# Patient Record
Sex: MALE | Race: WHITE | URBAN - METROPOLITAN AREA
[De-identification: names, ages, dates, MRNs, and addresses within clinical notes are randomized per-mention and may not be internally consistent; named-entity substitution may affect disease eponyms.]

---

## 2017-01-18 ENCOUNTER — OFFICE VISIT (OUTPATIENT)
Dept: PAIN MANAGEMENT | Age: 47
End: 2017-01-18

## 2017-01-18 VITALS
WEIGHT: 198 LBS | SYSTOLIC BLOOD PRESSURE: 172 MMHG | BODY MASS INDEX: 29.33 KG/M2 | HEIGHT: 69 IN | DIASTOLIC BLOOD PRESSURE: 91 MMHG | HEART RATE: 113 BPM

## 2017-01-18 DIAGNOSIS — G89.4 CHRONIC PAIN SYNDROME: Primary | ICD-10-CM

## 2017-01-18 DIAGNOSIS — G89.29 INSOMNIA SECONDARY TO CHRONIC PAIN: ICD-10-CM

## 2017-01-18 DIAGNOSIS — R00.0 TACHYCARDIA: ICD-10-CM

## 2017-01-18 DIAGNOSIS — M15.9 PRIMARY OSTEOARTHRITIS INVOLVING MULTIPLE JOINTS: ICD-10-CM

## 2017-01-18 DIAGNOSIS — M62.838 MUSCLE SPASM: ICD-10-CM

## 2017-01-18 DIAGNOSIS — F51.04 CHRONIC INSOMNIA: ICD-10-CM

## 2017-01-18 DIAGNOSIS — M51.37 DEGENERATION OF LUMBAR OR LUMBOSACRAL INTERVERTEBRAL DISC: ICD-10-CM

## 2017-01-18 DIAGNOSIS — I10 ESSENTIAL HYPERTENSION: ICD-10-CM

## 2017-01-18 DIAGNOSIS — G47.01 INSOMNIA SECONDARY TO CHRONIC PAIN: ICD-10-CM

## 2017-01-18 RX ORDER — OXYCODONE AND ACETAMINOPHEN 10; 325 MG/1; MG/1
TABLET ORAL
Qty: 120 TAB | Refills: 0 | Status: SHIPPED | OUTPATIENT
Start: 2017-01-18 | End: 2017-03-15 | Stop reason: SDUPTHER

## 2017-01-18 RX ORDER — MORPHINE SULFATE 100 MG/1
100 TABLET, FILM COATED, EXTENDED RELEASE ORAL EVERY 8 HOURS
Qty: 90 TAB | Refills: 0 | Status: SHIPPED | OUTPATIENT
Start: 2017-01-18 | End: 2017-02-17

## 2017-01-18 RX ORDER — MORPHINE SULFATE 100 MG/1
100 TABLET, FILM COATED, EXTENDED RELEASE ORAL EVERY 8 HOURS
Qty: 90 TAB | Refills: 0 | Status: SHIPPED | OUTPATIENT
Start: 2017-01-18 | End: 2017-03-15 | Stop reason: SDUPTHER

## 2017-01-18 RX ORDER — ALPRAZOLAM 1 MG/1
TABLET ORAL
Qty: 45 TAB | Refills: 1 | Status: SHIPPED | OUTPATIENT
Start: 2017-01-18 | End: 2017-03-15 | Stop reason: SDUPTHER

## 2017-01-18 NOTE — PATIENT INSTRUCTIONS
1.  You must have a follow up with your pcp to evaluate your blood pressure (in order for this office to continue to manage your pain)  2. Continue medications as prescribed  3. EKG today  4. Follow up in two months  5.   Please go to pcpED for your cough

## 2017-01-18 NOTE — PROGRESS NOTES
Nursing Notes    Patient presents to the office today in follow-up. Patient rates his pain at 7/10 on the numerical pain scale. Reviewed medications with counts as follows:    Rx Date filled Qty Dispensed Pill Count Last Dose Short   Percocet 10/325 mg  12/22/16 120 20 today no   Xanax 1 mg  12/21/16 45 8 Last night no   Morphine sulfate  mg 12/22/16 90 13 today no                     POC UDS was not performed in office today    Any new labs or imaging since last appointment? NO    Have you been to an emergency room (ER) or urgent care clinic since your last visit? NO            Have you been hospitalized since your last visit? NO     If yes, where, when, and reason for visit? Have you seen or consulted any other health care providers outside of the [address] [place] [address]  since your last visit? NO     If yes, where, when, and reason for visit? HM deferred to pcp.

## 2017-01-18 NOTE — PROGRESS NOTES
HISTORY OF PRESENT ILLNESS  Oscar Burciaga is a 40 y.o. male. HPI  The patient presents today for follow up of chronic severe pain involving the lumbar spine attributed to degenerative disc disease, spondylosis, and radiculopathy. The patient denies any significant changes since last f/u. He tolerates medications without side effects. Oscar Burciaga reports no change in sleep or constipation. He takes xanax for sleep. No cpap. The patient reports 60% relief with current medications. His pain is constant and achy. 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 He does do a lot of traveling for work. Any activity increases his pain while rest and medication help alleviate. He has tried cutting back on his pain medication but reports his pain level increases when he does this. He has been stable on his current dose for several years. He works full time. He reports he could not work without pain management. The patient reports functional improvement and QOL with pain medication. He is advised in order for this office to continue to treat his chronic pain he must have regular follow ups with his pcp. UDS 11/23/16 reviewed and consistent  Will do EKG in office today. He is not getting many hours for work and has not been able to do recommended follow ups. He reports not been feeling well for about 2 weeks (coughing, no fever). He is trying to quit smoking. Review of Systems   Constitutional: Positive for malaise/fatigue. Negative for chills, diaphoresis and fever. Eyes: Negative for blurred vision and double vision. Respiratory: Negative for cough, shortness of breath and wheezing. Cardiovascular: Positive for leg swelling (knees). Negative for chest pain and palpitations. Gastrointestinal: Negative for constipation, diarrhea and nausea. Genitourinary: Negative. Musculoskeletal: Positive for joint pain (knees) and myalgias.  Negative for falls and neck pain. Skin: Negative for rash. Neurological: Positive for headaches. Negative for dizziness and tremors. Psychiatric/Behavioral: Negative for depression. The patient has insomnia. The patient is not nervous/anxious. All other systems reviewed and are negative. Physical Exam   Constitutional: He is oriented to person, place, and time. He appears well-developed and well-nourished. No distress. HENT:   Head: Normocephalic. Right Ear: External ear normal.   Left Ear: External ear normal.   Eyes: Conjunctivae and EOM are normal. Pupils are equal, round, and reactive to light. Neck: Normal range of motion. Pulmonary/Chest: Effort normal. No respiratory distress. Musculoskeletal: He exhibits no edema or tenderness. Right knee: He exhibits decreased range of motion. No tenderness (but pain and crepitus) found. Left knee: He exhibits decreased range of motion. No tenderness (but pain and crepitus) found. Lumbar back: He exhibits decreased range of motion and pain. He exhibits no tenderness. Neurological: He is alert and oriented to person, place, and time. No cranial nerve deficit (grossly intact). Gait (antalgic) abnormal.   Skin: Skin is warm and dry. Psychiatric: He has a normal mood and affect. His behavior is normal. Judgment and thought content normal.   Nursing note and vitals reviewed. ASSESSMENT and PLAN  Encounter Diagnoses   Name Primary?     Chronic pain syndrome Yes    Degeneration of lumbar or lumbosacral intervertebral disc     Primary osteoarthritis involving multiple joints     Chronic insomnia     Essential hypertension     Tachycardia     Insomnia secondary to chronic pain     Muscle spasm      Orders Placed This Encounter    AMB POC EKG ROUTINE W/ 12 LEADS, INTER & REP    morphine CR (MS CONTIN) 100 mg CR tablet    morphine CR (MS CONTIN) 100 mg CR tablet    oxyCODONE-acetaminophen (PERCOCET)  mg per tablet    oxyCODONE-acetaminophen (PERCOCET)  mg per tablet    ALPRAZolam (XANAX) 1 mg tablet      Patient Instructions   1. You must have a follow up with your pcp to evaluate your blood pressure (in order for this office to continue to manage your pain)  2. Continue medications as prescribed  3. EKG today  4. Follow up in two months  5. Please go to pcpED for your cough      40 minutes spent in visit face to face with the patient. >50% of time spent counseling and coordinating care  EKG done in office/pt advised to see pcp asap  Dr. Nicole Jacobo to review results.

## 2017-01-18 NOTE — MR AVS SNAPSHOT
Visit Information Date & Time Provider Department Dept. Phone Encounter #  
 1/18/2017  4:00 PM Zenaida Butcher, [facility] [id number] Follow-up Instructions Return in about 2 months (around 3/18/2017). Upcoming Health Maintenance Date Due Pneumococcal 19-64 Medium Risk (1 of 1 - PPSV23) 8/14/1990 DTaP/Tdap/Td series (1 - Tdap) 8/14/1992 INFLUENZA AGE 9 TO ADULT 8/1/2016 Allergies as of 1/18/2017  Review Complete On: 1/18/2017 By: VALERIE Henderson Severity Noted Reaction Type Reactions Naprosyn [Naproxen]    Other (comments) Upset stomach Current Immunizations  Never Reviewed No immunizations on file. Not reviewed this visit You Were Diagnosed With   
  
 Codes Comments Chronic pain syndrome    -  Primary ICD-10-CM: G89.4 ICD-9-CM: 338.4 Degeneration of lumbar or lumbosacral intervertebral disc     ICD-10-CM: M51.37 
ICD-9-CM: 722.52 Primary osteoarthritis involving multiple joints     ICD-10-CM: M15.0 ICD-9-CM: 715.09 Chronic insomnia     ICD-10-CM: F51.04 
ICD-9-CM: 780.52 Essential hypertension     ICD-10-CM: I10 
ICD-9-CM: 401.9 Tachycardia     ICD-10-CM: R00.0 ICD-9-CM: 785.0 Insomnia secondary to chronic pain     ICD-10-CM: G89.29, G47.01 
ICD-9-CM: 338.29, 327.01   
 Muscle spasm     ICD-10-CM: X19.931 ICD-9-CM: 728.85 Vitals BP Pulse Height(growth percentile) Weight(growth percentile) BMI Smoking Status (!) 172/91 (!) 113 5' 9\" (1.753 m) 198 lb (89.8 kg) 29.24 kg/m2 Current Every Day Smoker BMI and BSA Data Body Mass Index Body Surface Area  
 29.24 kg/m 2 2.09 m 2 Preferred Pharmacy Pharmacy Name Phone [place], [address] [phone] Your Updated Medication List  
  
   
 This list is accurate as of: 1/18/17  4:27 PM.  Always use your most recent med list.  
  
  
  
  
 * ALPRAZolam 1 mg tablet Commonly known as:  Nolan Amend Take 1 tab by mouth at bedtime for insomnia. May take a second tab for severe insomnia. Due to fill 6/24/16 * ALPRAZolam 1 mg tablet Commonly known as:  Nolan Amend Take 1 tab by mouth at bedtime for insomnia. May take a second tab for severe insomnia. cyclobenzaprine 10 mg tablet Commonly known as:  FLEXERIL Take 1 Tab by mouth three (3) times daily as needed for Muscle Spasm(s). ibuprofen 200 mg tablet Commonly known as:  MOTRIN Take  by mouth. * morphine  mg CR tablet Commonly known as:  MS CONTIN Take  by mouth three (3) times daily. * morphine  mg CR tablet Commonly known as:  MS CONTIN Take 1 Tab by mouth every eight (8) hours for 30 days. For chronic pain. (due to fill 2/18/17)  Indications: CHRONIC PAIN WITH OPIOID TOLERANCE, SEVERE PAIN WITH OPIOID TOLERANCE  
  
 * morphine  mg CR tablet Commonly known as:  MS CONTIN Take 1 Tab by mouth every eight (8) hours for 30 days. For chronic pain. Due to fill 1/20/17  Indications: CHRONIC PAIN WITH OPIOID TOLERANCE, SEVERE PAIN WITH OPIOID TOLERANCE  
  
 * oxyCODONE-acetaminophen  mg per tablet Commonly known as:  PERCOCET Take one tab po up to four times per day prn severe pain (due to fill 7/1/16) * oxyCODONE-acetaminophen  mg per tablet Commonly known as:  PERCOCET Take one tab po up to four times per day prn severe pain  Indications: PAIN  
  
 * oxyCODONE-acetaminophen  mg per tablet Commonly known as:  PERCOCET Take one tab po up to four times per day prn severe pain (due to fill 2/18/17) * oxyCODONE-acetaminophen  mg per tablet Commonly known as:  PERCOCET Take one tab po up to four times per day prn severe pain (due to fill 1/20/17)  Indications: PAIN  
  
 tiZANidine 4 mg tablet Commonly known as:  Penny Fulling Take 1/2 to one tab po up to three times per day prn muscle spasms * Notice: This list has 9 medication(s) that are the same as other medications prescribed for you. Read the directions carefully, and ask your doctor or other care provider to review them with you. Prescriptions Printed Refills  
 morphine CR (MS CONTIN) 100 mg CR tablet 0 Sig: Take 1 Tab by mouth every eight (8) hours for 30 days. For chronic pain. (due to fill 2/18/17)  Indications: CHRONIC PAIN WITH OPIOID TOLERANCE, SEVERE PAIN WITH OPIOID TOLERANCE Class: Print Route: Oral  
 morphine CR (MS CONTIN) 100 mg CR tablet 0 Sig: Take 1 Tab by mouth every eight (8) hours for 30 days. For chronic pain. Due to fill 1/20/17  Indications: CHRONIC PAIN WITH OPIOID TOLERANCE, SEVERE PAIN WITH OPIOID TOLERANCE Class: Print Route: Oral  
 oxyCODONE-acetaminophen (PERCOCET)  mg per tablet 0 Sig: Take one tab po up to four times per day prn severe pain (due to fill 2/18/17) Class: Print  
 oxyCODONE-acetaminophen (PERCOCET)  mg per tablet 0 Sig: Take one tab po up to four times per day prn severe pain (due to fill 1/20/17)  Indications: PAIN Class: Print ALPRAZolam (XANAX) 1 mg tablet 1 Sig: Take 1 tab by mouth at bedtime for insomnia. May take a second tab for severe insomnia. Class: Print We Performed the Following AMB POC EKG ROUTINE W/ 12 LEADS, INTER & REP [90425 CPT(R)] Follow-up Instructions Return in about 2 months (around 3/18/2017). Patient Instructions 1. You must have a follow up with your pcp to evaluate your blood pressure (in order for this office to continue to manage your pain) 2. Continue medications as prescribed 3. EKG today 4. Follow up in two months 5. Please go to pcpED for your cough Introducing [facility]!    
 Kerrie Valle introduces [employer] patient portal. Now you can access parts of your medical record, email your doctor's office, and request medication refills online. 1. In your internet browser, go to [email or web address] 2. Click on the First Time User? Click Here link in the Sign In box. You will see the New Member Sign Up page. 3. Enter your [employer] Access Code exactly as it appears below. You will not need to use this code after youve completed the sign-up process. If you do not sign up before the expiration date, you must request a new code. · [employer] Access Code: [email or web address] Expires: 4/18/2017  4:07 PM 
 
4. Enter the last four digits of your Social Security Number (xxxx) and Date of Birth (mm/dd/yyyy) as indicated and click Submit. You will be taken to the next sign-up page. 5. Create a [employer] ID. This will be your [employer] login ID and cannot be changed, so think of one that is secure and easy to remember. 6. Create a [employer] password. You can change your password at any time. 7. Enter your Password Reset Question and Answer. This can be used at a later time if you forget your password. 8. Enter your e-mail address. You will receive e-mail notification when new information is available in [address] 9. Click Sign Up. You can now view and download portions of your medical record. 10. Click the Download Summary menu link to download a portable copy of your medical information. If you have questions, please visit the Frequently Asked Questions section of the [employer] website. Remember, [employer] is NOT to be used for urgent needs. For medical emergencies, dial 911. Now available from your iPhone and Android! Please provide this summary of care documentation to your next provider. Your primary care clinician is listed as Alva Biggs. If you have any questions after today's visit, please call [phone].

## 2017-03-15 ENCOUNTER — OFFICE VISIT (OUTPATIENT)
Dept: PAIN MANAGEMENT | Age: 47
End: 2017-03-15

## 2017-03-15 VITALS
HEART RATE: 96 BPM | WEIGHT: 198 LBS | DIASTOLIC BLOOD PRESSURE: 97 MMHG | BODY MASS INDEX: 29.24 KG/M2 | SYSTOLIC BLOOD PRESSURE: 180 MMHG

## 2017-03-15 DIAGNOSIS — G89.4 CHRONIC PAIN SYNDROME: Primary | ICD-10-CM

## 2017-03-15 DIAGNOSIS — M62.838 MUSCLE SPASM: ICD-10-CM

## 2017-03-15 DIAGNOSIS — M51.37 DEGENERATION OF LUMBAR OR LUMBOSACRAL INTERVERTEBRAL DISC: ICD-10-CM

## 2017-03-15 DIAGNOSIS — M54.17 LUMBOSACRAL RADICULOPATHY: ICD-10-CM

## 2017-03-15 DIAGNOSIS — M15.9 PRIMARY OSTEOARTHRITIS INVOLVING MULTIPLE JOINTS: ICD-10-CM

## 2017-03-15 DIAGNOSIS — F51.04 CHRONIC INSOMNIA: ICD-10-CM

## 2017-03-15 DIAGNOSIS — Z79.899 ENCOUNTER FOR LONG-TERM (CURRENT) USE OF HIGH-RISK MEDICATION: ICD-10-CM

## 2017-03-15 RX ORDER — MORPHINE SULFATE 100 MG/1
100 TABLET, FILM COATED, EXTENDED RELEASE ORAL EVERY 8 HOURS
Qty: 90 TAB | Refills: 0 | Status: SHIPPED | OUTPATIENT
Start: 2017-03-15 | End: 2017-04-14

## 2017-03-15 RX ORDER — ALPRAZOLAM 1 MG/1
TABLET ORAL
Qty: 45 TAB | Refills: 1 | Status: SHIPPED | OUTPATIENT
Start: 2017-03-15 | End: 2017-05-12 | Stop reason: SDUPTHER

## 2017-03-15 RX ORDER — OXYCODONE AND ACETAMINOPHEN 10; 325 MG/1; MG/1
TABLET ORAL
Qty: 120 TAB | Refills: 0 | Status: SHIPPED | OUTPATIENT
Start: 2017-03-15 | End: 2017-08-28

## 2017-03-15 RX ORDER — NALOXONE HYDROCHLORIDE 4 MG/.1ML
4 SPRAY NASAL AS NEEDED
Qty: 1 BOX | Refills: 1 | Status: SHIPPED | OUTPATIENT
Start: 2017-03-15

## 2017-03-15 RX ORDER — AMLODIPINE BESYLATE 5 MG/1
5 TABLET ORAL DAILY
COMMUNITY

## 2017-03-15 RX ORDER — OXYCODONE AND ACETAMINOPHEN 10; 325 MG/1; MG/1
TABLET ORAL
Qty: 120 TAB | Refills: 0 | Status: SHIPPED | OUTPATIENT
Start: 2017-03-15 | End: 2017-06-06 | Stop reason: SDUPTHER

## 2017-03-15 NOTE — PROGRESS NOTES
Nursing Notes    Patient presents to the office today in follow-up. Patient rates his pain at 7/10 on the numerical pain scale. Reviewed medications with counts as follows:    Rx Date filled Qty Dispensed Pill Count Last Dose Short   Percocet 10 2/17/17 120 20 3/15/17 no   MS Contin 100 2/17/17 90 16 3/15/17 no   Xanax 1 2/17/17 45 8 3/15/17 no         Comments:     POC UDS was not performed in office today    Any new labs or imaging since last appointment? NO    Have you been to an emergency room (ER) or urgent care clinic since your last visit? NO            Have you been hospitalized since your last visit? NO     If yes, where, when, and reason for visit? Have you seen or consulted any other health care providers outside of the [facility]  since your last visit? NO     If yes, where, when, and reason for visit? Mr. Erlin Keita has a reminder for a \"due or due soon\" health maintenance. I have asked that he contact his primary care provider for follow-up on this health maintenance.

## 2017-03-15 NOTE — PATIENT INSTRUCTIONS
1.  Continue medications as prescribed  2. Follow up in two months  3. Please see pcp regarding elevated bp  4. Naloxone prescription written and discussed (for acute opioid toxicity only)  40 minutes spent in visit face to face with the patient.     >50% of time spent counseling and coordinating care

## 2017-03-15 NOTE — PROGRESS NOTES
HISTORY OF PRESENT ILLNESS  Sarika Chu is a 40 y.o. male. HPI  The patient presents today for follow up of chronic severe pain involving the lumbar spine attributed to degenerative disc disease, spondylosis, and radiculopathy. He has an upcoming appointment with a family physician regarding his blood pressure. Discussed at length again today. The patient denies any significant changes since last f/u. He tolerates medications without side effects. Sarika Chu reports no change in sleep or constipation. He takes xanax for sleep. No cpap. The patient reports 30-60% relief with current medications. His pain is constant and achy. 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 He does do a lot of traveling for work. Any activity increases his pain while rest and medication help alleviate. He has tried cutting back on his pain medication but reports his pain level increases when he does this. He has been stable on his current dose for several years. He works full time. He reports he could not work without pain management. The patient reports functional improvement and QOL with pain medication. Because the patient's current regimen places him/her at increased risk for possible overdose, a prescription for naloxone nasal spray is being provided. The patient understands that this medication is only to be used in the setting of a possible overdose and that inadvertent use of this medication could precipitate overt withdrawal.      Review of Systems   Constitutional: Positive for malaise/fatigue. Negative for chills, diaphoresis and fever. Eyes: Negative for blurred vision and double vision. Respiratory: Negative for cough, shortness of breath and wheezing. Cardiovascular: Positive for leg swelling (knees). Negative for chest pain and palpitations. Gastrointestinal: Negative for constipation, diarrhea and nausea. Genitourinary: Negative. Musculoskeletal: Positive for joint pain (knees) and myalgias. Negative for falls and neck pain. Skin: Negative for rash. Neurological: Positive for headaches. Negative for dizziness and tremors. Psychiatric/Behavioral: Negative for depression. The patient has insomnia. The patient is not nervous/anxious. All other systems reviewed and are negative. Physical Exam   Constitutional: He is oriented to person, place, and time. He appears well-developed and well-nourished. No distress. HENT:   Head: Normocephalic. Right Ear: External ear normal.   Left Ear: External ear normal.   Eyes: Conjunctivae and EOM are normal. Pupils are equal, round, and reactive to light. Neck: Normal range of motion. Pulmonary/Chest: Effort normal. No respiratory distress. Musculoskeletal: He exhibits no edema or tenderness. Right knee: He exhibits decreased range of motion. No tenderness (but pain and crepitus) found. Left knee: He exhibits decreased range of motion. No tenderness (but pain and crepitus) found. Lumbar back: He exhibits decreased range of motion and pain. He exhibits no tenderness. Neurological: He is alert and oriented to person, place, and time. No cranial nerve deficit (grossly intact). Gait (antalgic) abnormal.   Skin: Skin is warm and dry. Psychiatric: He has a normal mood and affect. His behavior is normal. Judgment and thought content normal.   Nursing note and vitals reviewed. The patient was advised about elevated blood pressure to discuss with pcp. ASSESSMENT and PLAN  Encounter Diagnoses   Name Primary?     Chronic pain syndrome Yes    Encounter for long-term (current) use of high-risk medication     Degeneration of lumbar or lumbosacral intervertebral disc     Lumbosacral radiculopathy     Chronic insomnia     Primary osteoarthritis involving multiple joints     Muscle spasm      Orders Placed This Encounter    amLODIPine (NORVASC) 5 mg tablet    morphine CR (MS CONTIN) 100 mg CR tablet    morphine CR (MS CONTIN) 100 mg CR tablet    ALPRAZolam (XANAX) 1 mg tablet    oxyCODONE-acetaminophen (PERCOCET)  mg per tablet    oxyCODONE-acetaminophen (PERCOCET)  mg per tablet    naloxone 4 mg/actuation spry        1. Continue medications as prescribed  2. Follow up in two months  3. Please see pcp regarding elevated bp  4. Naloxone prescription written and discussed (for acute opioid toxicity only)  40 minutes spent in visit face to face with the patient.     >50% of time spent counseling and coordinating care

## 2017-03-15 NOTE — MR AVS SNAPSHOT
Visit Information Date & Time Provider Department Dept. Phone Encounter #  
 3/15/2017  4:20 PM Obed Harada, [address] [facility] [phone] Follow-up Instructions Return in about 2 months (around 5/15/2017). Upcoming Health Maintenance Date Due Pneumococcal 19-64 Medium Risk (1 of 1 - PPSV23) 8/14/1990 DTaP/Tdap/Td series (1 - Tdap) 8/14/1992 INFLUENZA AGE 9 TO ADULT 8/1/2016 Allergies as of 3/15/2017  Review Complete On: 3/15/2017 By: Obed Harada, PA Severity Noted Reaction Type Reactions Naprosyn [Naproxen]    Other (comments) Upset stomach Current Immunizations  Never Reviewed No immunizations on file. Not reviewed this visit You Were Diagnosed With   
  
 Codes Comments Chronic pain syndrome    -  Primary ICD-10-CM: G89.4 ICD-9-CM: 338.4 Encounter for long-term (current) use of high-risk medication     ICD-10-CM: Z79.899 ICD-9-CM: V58.69 Degeneration of lumbar or lumbosacral intervertebral disc     ICD-10-CM: M51.37 
ICD-9-CM: 722.52 Lumbosacral radiculopathy     ICD-10-CM: M54.17 ICD-9-CM: 724.4 Chronic insomnia     ICD-10-CM: F51.04 
ICD-9-CM: 780.52 Primary osteoarthritis involving multiple joints     ICD-10-CM: M15.0 ICD-9-CM: 715.09   
 Muscle spasm     ICD-10-CM: F50.525 ICD-9-CM: 728.85 Vitals BP Pulse Weight(growth percentile) BMI Smoking Status (!) 180/97 96 198 lb (89.8 kg) 29.24 kg/m2 Current Every Day Smoker Vitals History BMI and BSA Data Body Mass Index Body Surface Area  
 29.24 kg/m 2 2.09 m 2 Preferred Pharmacy Pharmacy Name Phone [place], [address] [facility] 11 [facility] [phone] Your Updated Medication List  
  
   
This list is accurate as of: 3/15/17  5:14 PM.  Always use your most recent med list.  
  
  
  
  
 * ALPRAZolam 1 mg tablet Commonly known as:  Star Nathan Take 1 tab by mouth at bedtime for insomnia. May take a second tab for severe insomnia. Due to fill 6/24/16 * ALPRAZolam 1 mg tablet Commonly known as:  Star Carp Take 1 tab by mouth at bedtime for insomnia. May take a second tab for severe insomnia. amLODIPine 5 mg tablet Commonly known as:  Dent Royals Take 5 mg by mouth daily. cyclobenzaprine 10 mg tablet Commonly known as:  FLEXERIL Take 1 Tab by mouth three (3) times daily as needed for Muscle Spasm(s). ibuprofen 200 mg tablet Commonly known as:  MOTRIN Take  by mouth. * morphine  mg CR tablet Commonly known as:  MS CONTIN Take  by mouth three (3) times daily. * morphine  mg CR tablet Commonly known as:  MS CONTIN Take 1 Tab by mouth every eight (8) hours for 30 days. For chronic pain. Due to fill 4/16/17  Indications: Chronic Pain with Opioid Tolerance, SEVERE PAIN WITH OPIOID TOLERANCE  
  
 * morphine  mg CR tablet Commonly known as:  MS CONTIN Take 1 Tab by mouth every eight (8) hours for 30 days. For chronic pain. (due to fill 3/18/17)  Indications: Chronic Pain with Opioid Tolerance, SEVERE PAIN WITH OPIOID TOLERANCE  
  
 naloxone 4 mg/actuation Spry 4 mg by Nasal route as needed for up to 2 doses. Indications: OPIOID TOXICITY  
  
 * oxyCODONE-acetaminophen  mg per tablet Commonly known as:  PERCOCET Take one tab po up to four times per day prn severe pain (due to fill 7/1/16) * oxyCODONE-acetaminophen  mg per tablet Commonly known as:  PERCOCET Take one tab po up to four times per day prn severe pain (due to fill 4/16/17) * oxyCODONE-acetaminophen  mg per tablet Commonly known as:  PERCOCET Take one tab po up to four times per day prn severe pain (due to fill 3/18/17)  Indications: Pain  
  
 tiZANidine 4 mg tablet Commonly known as:  Penny Fulling Take 1/2 to one tab po up to three times per day prn muscle spasms * Notice: This list has 8 medication(s) that are the same as other medications prescribed for you. Read the directions carefully, and ask your doctor or other care provider to review them with you. Prescriptions Printed Refills  
 morphine CR (MS CONTIN) 100 mg CR tablet 0 Sig: Take 1 Tab by mouth every eight (8) hours for 30 days. For chronic pain. Due to fill 17  Indications: Chronic Pain with Opioid Tolerance, SEVERE PAIN WITH OPIOID TOLERANCE Class: Print Route: Oral  
 morphine CR (MS CONTIN) 100 mg CR tablet 0 Sig: Take 1 Tab by mouth every eight (8) hours for 30 days. For chronic pain. (due to fill 3/18/17)  Indications: Chronic Pain with Opioid Tolerance, SEVERE PAIN WITH OPIOID TOLERANCE Class: Print Route: Oral  
 ALPRAZolam (XANAX) 1 mg tablet 1 Sig: Take 1 tab by mouth at bedtime for insomnia. May take a second tab for severe insomnia. Class: Print  
 oxyCODONE-acetaminophen (PERCOCET)  mg per tablet 0 Sig: Take one tab po up to four times per day prn severe pain (due to fill 17) Class: Print  
 oxyCODONE-acetaminophen (PERCOCET)  mg per tablet 0 Sig: Take one tab po up to four times per day prn severe pain (due to fill 3/18/17)  Indications: Pain Class: Print Prescriptions Sent to Pharmacy Refills  
 naloxone 4 mg/actuation spry 1 Si mg by Nasal route as needed for up to 2 doses. Indications: OPIOID TOXICITY Class: Normal  
 Pharmacy: [facility] [address] [place] [address], [address] [place] [address] #: [phone] Route: Nasal  
  
Follow-up Instructions Return in about 2 months (around 5/15/2017). Patient Instructions 1. Continue medications as prescribed 2. Follow up in two months 3. Please see pcp regarding elevated bp 4. Naloxone prescription written and discussed (for acute opioid toxicity only) 40 minutes spent in visit face to face with the patient. >50% of time spent counseling and coordinating care Introducing [facility]! [facility] introduces [email or web address] patient portal. Now you can access parts of your medical record, email your doctor's office, and request medication refills online. 1. In your internet browser, go to [email or web address] 2. Click on the First Time User? Click Here link in the Sign In box. You will see the New Member Sign Up page. 3. Enter your Brighter.com Access Code exactly as it appears below. You will not need to use this code after youve completed the sign-up process. If you do not sign up before the expiration date, you must request a new code. · [email or web address] Access Code: [email or web address] Expires: 4/18/2017  5:07 PM 
 
4. Enter the last four digits of your Social Security Number (xxxx) and Date of Birth (mm/dd/yyyy) as indicated and click Submit. You will be taken to the next sign-up page. 5. Create a Brighter.[email or web address] ID. This will be your Brighter.com login ID and cannot be changed, so think of one that is secure and easy to remember. 6. Create a Brighter.com password. You can change your password at any time. 7. Enter your Password Reset Question and Answer. This can be used at a later time if you forget your password. 8. Enter your e-mail address. You will receive e-mail notification when new information is available in [address] 9. Click Sign Up. You can now view and download portions of your medical record. 10. Click the Download Summary menu link to download a portable copy of your medical information. If you have questions, please visit the Frequently Asked Questions section of the [email or web address] website. Remember, [email or web address].com is NOT to be used for urgent needs. For medical emergencies, dial 911. Now available from your iPhone and Android! Please provide this summary of care documentation to your next provider. Your primary care clinician is listed as Lucy Wheat. If you have any questions after today's visit, please call [phone].

## 2017-05-12 ENCOUNTER — OFFICE VISIT (OUTPATIENT)
Dept: PAIN MANAGEMENT | Age: 47
End: 2017-05-12

## 2017-05-12 DIAGNOSIS — F51.04 CHRONIC INSOMNIA: ICD-10-CM

## 2017-05-12 DIAGNOSIS — Z79.899 ENCOUNTER FOR LONG-TERM (CURRENT) USE OF HIGH-RISK MEDICATION: Primary | ICD-10-CM

## 2017-05-12 DIAGNOSIS — M51.37 DEGENERATION OF LUMBAR OR LUMBOSACRAL INTERVERTEBRAL DISC: ICD-10-CM

## 2017-05-12 LAB
ALCOHOL UR POC: NORMAL
AMPHETAMINES UR POC: NEGATIVE
BARBITURATES UR POC: NEGATIVE
BENZODIAZEPINES UR POC: NORMAL
BUPRENORPHINE UR POC: NORMAL
CANNABINOIDS UR POC: NEGATIVE
CARISOPRODOL UR POC: NORMAL
COCAINE UR POC: NEGATIVE
FENTANYL UR POC: NORMAL
MDMA/ECSTASY UR POC: NEGATIVE
METHADONE UR POC: NEGATIVE
METHAMPHETAMINE UR POC: NEGATIVE
METHYLPHENIDATE UR POC: NEGATIVE
OPIATES UR POC: NORMAL
OXYCODONE UR POC: NORMAL
PHENCYCLIDINE UR POC: NEGATIVE
PROPOXYPHENE UR POC: NORMAL
TRAMADOL UR POC: NORMAL
TRICYCLICS UR POC: NEGATIVE

## 2017-05-12 RX ORDER — ALPRAZOLAM 1 MG/1
TABLET ORAL
Qty: 45 TAB | Refills: 0 | Status: SHIPPED | OUTPATIENT
Start: 2017-05-14 | End: 2017-06-06 | Stop reason: SDUPTHER

## 2017-05-12 RX ORDER — MORPHINE SULFATE 100 MG/1
100 TABLET, FILM COATED, EXTENDED RELEASE ORAL 3 TIMES DAILY
Qty: 90 TAB | Refills: 0 | Status: SHIPPED | OUTPATIENT
Start: 2017-05-14 | End: 2017-06-06 | Stop reason: SDUPTHER

## 2017-05-12 RX ORDER — OXYCODONE AND ACETAMINOPHEN 10; 325 MG/1; MG/1
TABLET ORAL
Qty: 120 TAB | Refills: 0 | Status: SHIPPED | OUTPATIENT
Start: 2017-05-14 | End: 2017-06-06 | Stop reason: SDUPTHER

## 2017-05-12 NOTE — TELEPHONE ENCOUNTER
Patient left a voicemail reporting that the office rescheduled his appointment. Requesting medications. New appointment is set for 6/6/17.   reviewed and noted last filled xanax and percocet 4/16/17

## 2017-06-06 ENCOUNTER — OFFICE VISIT (OUTPATIENT)
Dept: PAIN MANAGEMENT | Age: 47
End: 2017-06-06

## 2017-06-06 VITALS
HEART RATE: 83 BPM | BODY MASS INDEX: 29.24 KG/M2 | WEIGHT: 198 LBS | SYSTOLIC BLOOD PRESSURE: 164 MMHG | DIASTOLIC BLOOD PRESSURE: 87 MMHG

## 2017-06-06 DIAGNOSIS — M54.50 CHRONIC BILATERAL LOW BACK PAIN WITHOUT SCIATICA: ICD-10-CM

## 2017-06-06 DIAGNOSIS — M19.91 PRIMARY OSTEOARTHRITIS, UNSPECIFIED SITE: ICD-10-CM

## 2017-06-06 DIAGNOSIS — Z79.899 ENCOUNTER FOR LONG-TERM (CURRENT) USE OF OTHER MEDICATIONS: ICD-10-CM

## 2017-06-06 DIAGNOSIS — M51.37 DEGENERATION OF LUMBAR OR LUMBOSACRAL INTERVERTEBRAL DISC: ICD-10-CM

## 2017-06-06 DIAGNOSIS — G89.29 CHRONIC BILATERAL LOW BACK PAIN WITHOUT SCIATICA: ICD-10-CM

## 2017-06-06 DIAGNOSIS — M54.17 LUMBOSACRAL RADICULOPATHY: Primary | ICD-10-CM

## 2017-06-06 DIAGNOSIS — F51.04 CHRONIC INSOMNIA: ICD-10-CM

## 2017-06-06 RX ORDER — MORPHINE SULFATE 100 MG/1
100 TABLET, FILM COATED, EXTENDED RELEASE ORAL 3 TIMES DAILY
Qty: 90 TAB | Refills: 0 | Status: SHIPPED | OUTPATIENT
Start: 2017-07-11 | End: 2017-06-06 | Stop reason: SDUPTHER

## 2017-06-06 RX ORDER — CYCLOBENZAPRINE HCL 10 MG
10 TABLET ORAL
Qty: 90 TAB | Refills: 5 | Status: SHIPPED | OUTPATIENT
Start: 2017-06-06 | End: 2017-08-28 | Stop reason: SDUPTHER

## 2017-06-06 RX ORDER — OXYCODONE AND ACETAMINOPHEN 10; 325 MG/1; MG/1
TABLET ORAL
Qty: 120 TAB | Refills: 0 | Status: SHIPPED | OUTPATIENT
Start: 2017-08-09 | End: 2017-08-28 | Stop reason: SDUPTHER

## 2017-06-06 RX ORDER — MORPHINE SULFATE 100 MG/1
100 TABLET, FILM COATED, EXTENDED RELEASE ORAL 3 TIMES DAILY
Qty: 90 TAB | Refills: 0 | Status: SHIPPED | OUTPATIENT
Start: 2017-06-12 | End: 2017-06-06 | Stop reason: SDUPTHER

## 2017-06-06 RX ORDER — OXYCODONE AND ACETAMINOPHEN 10; 325 MG/1; MG/1
TABLET ORAL
Qty: 120 TAB | Refills: 0 | Status: SHIPPED | OUTPATIENT
Start: 2017-06-12 | End: 2017-07-06

## 2017-06-06 RX ORDER — BUPROPION HYDROCHLORIDE 150 MG/1
TABLET, EXTENDED RELEASE ORAL 2 TIMES DAILY
COMMUNITY
End: 2018-10-19

## 2017-06-06 RX ORDER — OXYCODONE AND ACETAMINOPHEN 10; 325 MG/1; MG/1
TABLET ORAL
Qty: 120 TAB | Refills: 0 | Status: SHIPPED | OUTPATIENT
Start: 2017-07-11 | End: 2017-06-06 | Stop reason: SDUPTHER

## 2017-06-06 RX ORDER — ALPRAZOLAM 1 MG/1
TABLET ORAL
Qty: 45 TAB | Refills: 2 | Status: SHIPPED | OUTPATIENT
Start: 2017-06-06 | End: 2017-08-28 | Stop reason: SDUPTHER

## 2017-06-06 RX ORDER — MORPHINE SULFATE 100 MG/1
100 TABLET, FILM COATED, EXTENDED RELEASE ORAL 3 TIMES DAILY
Qty: 90 TAB | Refills: 0 | Status: SHIPPED | OUTPATIENT
Start: 2017-08-09 | End: 2017-08-28 | Stop reason: SDUPTHER

## 2017-06-06 NOTE — MR AVS SNAPSHOT
Visit Information Date & Time Provider Department Dept. Phone Encounter #  
 6/6/2017  4:20 PM Naty Elham [facility] [phone] Follow-up Instructions Return in about 12 weeks (around 8/29/2017). Upcoming Health Maintenance Date Due Pneumococcal 19-64 Medium Risk (1 of 1 - PPSV23) 8/14/1990 DTaP/Tdap/Td series (1 - Tdap) 8/14/1992 INFLUENZA AGE 9 TO ADULT 8/1/2017 Allergies as of 6/6/2017  Review Complete On: 6/6/2017 By: Herve Blackwood RN Severity Noted Reaction Type Reactions Naprosyn [Naproxen]    Other (comments) Upset stomach Current Immunizations  Never Reviewed No immunizations on file. Not reviewed this visit You Were Diagnosed With   
  
 Codes Comments Lumbosacral radiculopathy    -  Primary ICD-10-CM: M54.17 ICD-9-CM: 724.4 Chronic insomnia     ICD-10-CM: F51.04 
ICD-9-CM: 780.52 Degeneration of lumbar or lumbosacral intervertebral disc     ICD-10-CM: M51.37 
ICD-9-CM: 722.52 Chronic bilateral low back pain without sciatica     ICD-10-CM: M54.5, G89.29 ICD-9-CM: 724.2, 338.29 Encounter for long-term (current) use of other medications     ICD-10-CM: Z79.899 ICD-9-CM: V58.69 Primary osteoarthritis, unspecified site     ICD-10-CM: M19.91 
ICD-9-CM: 715.10 Vitals BP Pulse Weight(growth percentile) BMI Smoking Status 164/87 83 198 lb (89.8 kg) 29.24 kg/m2 Current Every Day Smoker BMI and BSA Data Body Mass Index Body Surface Area  
 29.24 kg/m 2 2.09 m 2 Preferred Pharmacy Pharmacy Name Phone [place], [address] [facility] 11 [facility] [phone] Your Updated Medication List  
  
   
This list is accurate as of: 6/6/17  5:36 PM.  Always use your most recent med list.  
  
  
  
  
 * ALPRAZolam 1 mg tablet Commonly known as:  Lawyer Bonilla Take 1 tab by mouth at bedtime for insomnia. May take a second tab for severe insomnia. Due to fill 6/24/16 * ALPRAZolam 1 mg tablet Commonly known as:  Ezzijuan luis Gaudencio Take 1 tab by mouth at bedtime for insomnia. May take a second tab for severe insomnia. amLODIPine 5 mg tablet Commonly known as:  Benjiman Floro Take 5 mg by mouth daily. cyclobenzaprine 10 mg tablet Commonly known as:  FLEXERIL Take 1 Tab by mouth three (3) times daily as needed for Muscle Spasm(s). ibuprofen 200 mg tablet Commonly known as:  MOTRIN Take  by mouth.  
  
 morphine  mg CR tablet Commonly known as:  MS CONTIN Take 1 Tab by mouth three (3) times daily. Max Daily Amount: 300 mg. Start taking on:  8/9/2017  
  
 naloxone 4 mg/actuation Spry 4 mg by Nasal route as needed for up to 2 doses. Indications: OPIOID TOXICITY  
  
 * oxyCODONE-acetaminophen  mg per tablet Commonly known as:  PERCOCET Take one tab po up to four times per day prn severe pain (due to fill 4/16/17) * oxyCODONE-acetaminophen  mg per tablet Commonly known as:  PERCOCET Take one tab po up to four times per day prn severe pain Start taking on:  6/12/2017 * oxyCODONE-acetaminophen  mg per tablet Commonly known as:  PERCOCET Take one tab po up to four times per day prn severe pain Start taking on:  8/9/2017 WELLBUTRIN  mg SR tablet Generic drug:  buPROPion SR Take  by mouth two (2) times a day. * Notice: This list has 5 medication(s) that are the same as other medications prescribed for you. Read the directions carefully, and ask your doctor or other care provider to review them with you. Prescriptions Printed Refills  
 oxyCODONE-acetaminophen (PERCOCET)  mg per tablet 0 Starting on: 6/12/2017 Sig: Take one tab po up to four times per day prn severe pain  
 Class: Print  ALPRAZolam (XANAX) 1 mg tablet 2  
 Sig: Take 1 tab by mouth at bedtime for insomnia. May take a second tab for severe insomnia. Class: Print  
 morphine CR (MS CONTIN) 100 mg CR tablet 0 Starting on: 8/9/2017 Sig: Take 1 Tab by mouth three (3) times daily. Max Daily Amount: 300 mg. Class: Print Route: Oral  
 oxyCODONE-acetaminophen (PERCOCET)  mg per tablet 0 Starting on: 8/9/2017 Sig: Take one tab po up to four times per day prn severe pain  
 Class: Print Follow-up Instructions Return in about 12 weeks (around 8/29/2017). Patient Instructions Plan: 
Continue same medications as prescribed for chronic pain See your PCP regarding high blood pressure soon! Follow up in 3 months or sooner if needed Regular exercise and attention to emotional health and diet remain the most effective ways to treat chronic pain of all kinds You may contact me with questions or concerns through [address] Introducing [facility]! [facility] introduces Alo7 patient portal. Now you can access parts of your medical record, email your doctor's office, and request medication refills online. 1. In your internet browser, go to [email or web address] 2. Click on the First Time User? Click Here link in the Sign In box. You will see the New Member Sign Up page. 3. Enter your Alo7 Access Code exactly as it appears below. You will not need to use this code after youve completed the sign-up process. If you do not sign up before the expiration date, you must request a new code. · Alo7 Access Code: [email or web address] Expires: 9/4/2017  5:26 PM 
 
4. Enter the last four digits of your Social Security Number (xxxx) and Date of Birth (mm/dd/yyyy) as indicated and click Submit. You will be taken to the next sign-up page. 5. Create a Alo7 ID. This will be your Alo7 login ID and cannot be changed, so think of one that is secure and easy to remember. 6. Create a Skytide password. You can change your password at any time. 7. Enter your Password Reset Question and Answer. This can be used at a later time if you forget your password. 8. Enter your e-mail address. You will receive e-mail notification when new information is available in [address] 9. Click Sign Up. You can now view and download portions of your medical record. 10. Click the Download Summary menu link to download a portable copy of your medical information. If you have questions, please visit the Frequently Asked Questions section of the Skytide website. Remember, Skytide is NOT to be used for urgent needs. For medical emergencies, dial 911. Now available from your iPhone and Android! Please provide this summary of care documentation to your next provider. Your primary care clinician is listed as Lisa Serna. If you have any questions after today's visit, please call [phone].

## 2017-06-06 NOTE — PROGRESS NOTES
Nursing Notes    Patient presents to the office today in follow-up. Patient rates his pain at 6/10 on the numerical pain scale. Reviewed medications with counts as follows:    Rx Date filled Qty Dispensed Pill Count Last Dose Short   Percocet 10 5/14/17 120 20 6/6/17 no   MS Contin 100 5/14/17 90 14 6/6/17 no   Xanax 1 5/14/17 45 4 6/6/17 no       Comments:     POC UDS was not performed in office today    Any new labs or imaging since last appointment? YES, blood work c/u    Have you been to an emergency room (ER) or urgent care clinic since your last visit? NO            Have you been hospitalized since your last visit? NO     If yes, where, when, and reason for visit? Have you seen or consulted any other health care providers outside of the [address] [place] [address]  since your last visit? NO     If yes, where, when, and reason for visit? Mr. Kat Beckford has a reminder for a \"due or due soon\" health maintenance. I have asked that he contact his primary care provider for follow-up on this health maintenance.

## 2017-06-06 NOTE — PROGRESS NOTES
HISTORY OF PRESENT ILLNESS  Lang Zaragoza is a 40 y.o. male. The patient presents today for follow up of chronic severe pain involving the lumbar spine attributed to degenerative disc disease, spondylosis, and radiculopathy. He continues to endorse constant, at times profound pain in the lower back and knees. He has tried and failed a number of interventions over the past fifteen years, including lumbar ESIs, MBBs, physical therapy, TENS, and TPIs, none of which have offered significant relief. He finds that the combination of medications is the best treatment for his chronic, vexing pain. Pt reports average of 6/10 pain scale most days. Medications continue to work well for pain control overall. Lang Zaragoza is tolerating medications well, with no untoward side effects noted. He is able to stay more active with less discomfort with these current doses. The patient reports an average of 70% relief with this regimen. Most recent UDS and  were consistent with prescribed medications. Pill counts are appropriate. He is informed of side effects, risks, and benefits of this regimen, and emphasizes that he derives a significant improvement in functionality and quality of life, and notes that non-opioid medications and therapies in the past have not offered significant benefit. We discussed the risks and limitations of using Xanax with high dosed opioids. He understands that I will not be able to write this combination beyond today's visit, and will schedule his next visit with Dr. Denis Aparicio. He denies new or worsening insomnia or constipation issues. He denies any falls, injuries, or hospitalizations since the last visit. A total of 40 minutes was spent with the patient of which more than 50% of the time was spent counseling the patient. HPI--see above    ROS  Constitutional: Positive for malaise/fatigue. Negative for chills, diaphoresis and fever. Eyes: Negative for blurred vision and double vision. Respiratory: Negative for cough, shortness of breath and wheezing. Cardiovascular: Positive for leg swelling (knees). Negative for chest pain and palpitations. Gastrointestinal: Negative for constipation, diarrhea and nausea. Genitourinary: Negative. Musculoskeletal: Positive for joint pain (knees) and myalgias. Negative for falls and neck pain. Skin: Negative for rash. Neurological: Positive for headaches. Negative for dizziness and tremors. Psychiatric/Behavioral: Negative for depression. The patient has insomnia. The patient is not nervous/anxious. Physical Exam  Constitutional: He is oriented to person, place, and time. He appears well-developed and well-nourished. No distress. HENT:   Head: Normocephalic. Right Ear: External ear normal.   Left Ear: External ear normal.   Eyes: Conjunctivae and EOM are normal. Pupils are equal, round, and reactive to light. Neck: Normal range of motion. Pulmonary/Chest: Effort normal. No respiratory distress. Musculoskeletal: He exhibits no edema or tenderness. Right knee: He exhibits decreased range of motion. No tenderness (but pain and crepitus) found. Left knee: He exhibits decreased range of motion. No tenderness (but pain and crepitus) found. Lumbar back: He exhibits decreased range of motion and pain. He exhibits no tenderness. Neurological: He is alert and oriented to person, place, and time. No cranial nerve deficit (grossly intact). Gait (antalgic) abnormal.   Skin: Skin is warm and dry. Psychiatric: He has a normal mood and affect. His behavior is normal. Judgment and thought content normal.   ASSESSMENT and PLAN  Encounter Diagnoses   Name Primary?     Chronic insomnia     Degeneration of lumbar or lumbosacral intervertebral disc     Lumbosacral radiculopathy Yes    Chronic bilateral low back pain without sciatica     Encounter for long-term (current) use of other medications     Primary osteoarthritis, unspecified site         Plan:  Continue same medications as prescribed for chronic pain  See your PCP regarding high blood pressure soon!   Follow up in 3 months or sooner if needed  Regular exercise and attention to emotional health and diet remain the most effective ways to treat chronic pain of all kinds  You may contact me with questions or concerns through [address]

## 2017-06-06 NOTE — PATIENT INSTRUCTIONS
Plan:  Continue same medications as prescribed for chronic pain  See your PCP regarding high blood pressure soon!   Follow up in 3 months or sooner if needed  Regular exercise and attention to emotional health and diet remain the most effective ways to treat chronic pain of all kinds  You may contact me with questions or concerns through [address]

## 2017-08-28 ENCOUNTER — OFFICE VISIT (OUTPATIENT)
Dept: PAIN MANAGEMENT | Age: 47
End: 2017-08-28

## 2017-08-28 VITALS
TEMPERATURE: 98.9 F | DIASTOLIC BLOOD PRESSURE: 92 MMHG | WEIGHT: 198 LBS | BODY MASS INDEX: 29.24 KG/M2 | HEART RATE: 109 BPM | SYSTOLIC BLOOD PRESSURE: 160 MMHG | RESPIRATION RATE: 20 BRPM

## 2017-08-28 DIAGNOSIS — M54.17 LUMBOSACRAL RADICULOPATHY: ICD-10-CM

## 2017-08-28 DIAGNOSIS — G89.29 CHRONIC BILATERAL LOW BACK PAIN WITHOUT SCIATICA: ICD-10-CM

## 2017-08-28 DIAGNOSIS — F51.04 CHRONIC INSOMNIA: ICD-10-CM

## 2017-08-28 DIAGNOSIS — M51.37 DEGENERATION OF LUMBAR OR LUMBOSACRAL INTERVERTEBRAL DISC: ICD-10-CM

## 2017-08-28 DIAGNOSIS — M22.42 CHONDROMALACIA PATELLAE, LEFT: Primary | ICD-10-CM

## 2017-08-28 DIAGNOSIS — M17.12 ARTHRITIS OF LEFT KNEE: ICD-10-CM

## 2017-08-28 DIAGNOSIS — M25.462 KNEE EFFUSION, LEFT: ICD-10-CM

## 2017-08-28 DIAGNOSIS — M54.50 CHRONIC BILATERAL LOW BACK PAIN WITHOUT SCIATICA: ICD-10-CM

## 2017-08-28 PROBLEM — M22.40 CHONDROMALACIA PATELLAE: Status: ACTIVE | Noted: 2017-08-28

## 2017-08-28 RX ORDER — MORPHINE SULFATE 100 MG/1
100 TABLET, FILM COATED, EXTENDED RELEASE ORAL 3 TIMES DAILY
Qty: 90 TAB | Refills: 0 | Status: SHIPPED | OUTPATIENT
Start: 2017-10-04 | End: 2017-08-28 | Stop reason: SDUPTHER

## 2017-08-28 RX ORDER — OXYCODONE AND ACETAMINOPHEN 10; 325 MG/1; MG/1
TABLET ORAL
Qty: 120 TAB | Refills: 0 | Status: SHIPPED | OUTPATIENT
Start: 2017-12-01

## 2017-08-28 RX ORDER — MORPHINE SULFATE 100 MG/1
100 TABLET, FILM COATED, EXTENDED RELEASE ORAL 3 TIMES DAILY
Qty: 90 TAB | Refills: 0 | Status: SHIPPED | OUTPATIENT
Start: 2017-09-05 | End: 2017-08-28 | Stop reason: SDUPTHER

## 2017-08-28 RX ORDER — OXYCODONE AND ACETAMINOPHEN 10; 325 MG/1; MG/1
TABLET ORAL
Qty: 120 TAB | Refills: 0 | Status: SHIPPED | OUTPATIENT
Start: 2017-11-02 | End: 2017-08-28 | Stop reason: SDUPTHER

## 2017-08-28 RX ORDER — MORPHINE SULFATE 100 MG/1
100 TABLET, FILM COATED, EXTENDED RELEASE ORAL 3 TIMES DAILY
Qty: 90 TAB | Refills: 0 | Status: SHIPPED | OUTPATIENT
Start: 2017-12-01 | End: 2018-06-21 | Stop reason: SDUPTHER

## 2017-08-28 RX ORDER — CYCLOBENZAPRINE HCL 10 MG
10 TABLET ORAL
Qty: 90 TAB | Refills: 5 | Status: SHIPPED | OUTPATIENT
Start: 2017-08-28 | End: 2017-12-29 | Stop reason: SDUPTHER

## 2017-08-28 RX ORDER — OXYCODONE AND ACETAMINOPHEN 10; 325 MG/1; MG/1
TABLET ORAL
Qty: 120 TAB | Refills: 0 | Status: SHIPPED | OUTPATIENT
Start: 2017-09-05 | End: 2017-08-28 | Stop reason: SDUPTHER

## 2017-08-28 RX ORDER — MORPHINE SULFATE 100 MG/1
100 TABLET, FILM COATED, EXTENDED RELEASE ORAL 3 TIMES DAILY
Qty: 90 TAB | Refills: 0 | Status: SHIPPED | OUTPATIENT
Start: 2017-11-02 | End: 2017-08-28 | Stop reason: SDUPTHER

## 2017-08-28 RX ORDER — ALPRAZOLAM 1 MG/1
TABLET ORAL
Qty: 45 TAB | Refills: 3 | Status: SHIPPED | OUTPATIENT
Start: 2017-08-28 | End: 2018-10-27

## 2017-08-28 RX ORDER — OXYCODONE AND ACETAMINOPHEN 10; 325 MG/1; MG/1
TABLET ORAL
Qty: 120 TAB | Refills: 0 | Status: SHIPPED | OUTPATIENT
Start: 2017-10-04 | End: 2017-08-28 | Stop reason: SDUPTHER

## 2017-08-28 NOTE — PATIENT INSTRUCTIONS
Plan:  Continue same medications as prescribed for chronic pain  Geniculate nerve blocks/radiofrequency ablation have been shown to give up to a year of pain relief for knee pain due to arthritis.  There is a anesthesiologist with [facility] that performs these injections  We can refer you to Dr. Brittni Mar for aspiration of the effusion and possible cortisone injections  Follow up in 4 months or sooner if needed  Regular exercise and attention to emotional health and diet remain the most effective ways to treat chronic pain of all kinds  You may contact me with questions or concerns through [address]

## 2017-08-28 NOTE — MR AVS SNAPSHOT
Visit Information Date & Time Provider Department Dept. Phone Encounter #  
 8/28/2017  4:20 PM Naty Vasquez Dials [address] [facility] [phone] Follow-up Instructions Return in about 4 months (around 12/28/2017). Follow-up and Disposition History Upcoming Health Maintenance Date Due Pneumococcal 19-64 Medium Risk (1 of 1 - PPSV23) 8/14/1990 DTaP/Tdap/Td series (1 - Tdap) 8/14/1992 INFLUENZA AGE 9 TO ADULT 8/1/2017 Allergies as of 8/28/2017  Review Complete On: 8/28/2017 By: Tejas Serrano LPN Severity Noted Reaction Type Reactions Naprosyn [Naproxen]    Other (comments) Upset stomach Current Immunizations  Never Reviewed No immunizations on file. Not reviewed this visit You Were Diagnosed With   
  
 Codes Comments Chondromalacia patellae, left    -  Primary ICD-10-CM: M22.42 
ICD-9-CM: 717.7 Chronic insomnia     ICD-10-CM: F51.04 
ICD-9-CM: 780.52 Degeneration of lumbar or lumbosacral intervertebral disc     ICD-10-CM: M51.37 
ICD-9-CM: 722.52 Lumbosacral radiculopathy     ICD-10-CM: M54.17 ICD-9-CM: 724.4 Chronic bilateral low back pain without sciatica     ICD-10-CM: M54.5, G89.29 ICD-9-CM: 724.2, 338.29 Knee effusion, left     ICD-10-CM: M25.462 ICD-9-CM: 719.06 Arthritis of left knee     ICD-10-CM: M17.12 
ICD-9-CM: 716.96 Vitals BP Pulse Temp Resp Weight(growth percentile) BMI  
 (!) 160/92 (!) 109 98.9 °F (37.2 °C) 20 198 lb (89.8 kg) 29.24 kg/m2 Smoking Status Current Every Day Smoker Vitals History BMI and BSA Data Body Mass Index Body Surface Area  
 29.24 kg/m 2 2.09 m 2 Preferred Pharmacy Pharmacy Name Phone [place], [facility] [phone] Your Updated Medication List  
  
   
 This list is accurate as of: 8/28/17  6:12 PM.  Always use your most recent med list.  
  
  
  
  
 ALPRAZolam 1 mg tablet Commonly known as:  Ani  Take 1 tab by mouth at bedtime for insomnia. May take a second tab for severe insomnia. amLODIPine 5 mg tablet Commonly known as:  [place] Take 5 mg by mouth daily. cyclobenzaprine 10 mg tablet Commonly known as:  FLEXERIL Take 1 Tab by mouth three (3) times daily as needed for Muscle Spasm(s). ibuprofen 200 mg tablet Commonly known as:  MOTRIN Take  by mouth.  
  
 morphine  mg CR tablet Commonly known as:  MS CONTIN Take 1 Tab by mouth three (3) times daily. Max Daily Amount: 300 mg. Start taking on:  12/1/2017  
  
 naloxone 4 mg/actuation Spry 4 mg by Nasal route as needed for up to 2 doses. Indications: OPIOID TOXICITY  
  
 oxyCODONE-acetaminophen  mg per tablet Commonly known as:  PERCOCET Take one tab po up to four times per day prn severe pain Start taking on:  12/1/2017 WELLBUTRIN  mg SR tablet Generic drug:  buPROPion SR Take  by mouth two (2) times a day. Prescriptions Printed Refills ALPRAZolam (XANAX) 1 mg tablet 3 Sig: Take 1 tab by mouth at bedtime for insomnia. May take a second tab for severe insomnia. Class: Print  
 morphine CR (MS CONTIN) 100 mg CR tablet 0 Starting on: 12/1/2017 Sig: Take 1 Tab by mouth three (3) times daily. Max Daily Amount: 300 mg. Class: Print Route: Oral  
 oxyCODONE-acetaminophen (PERCOCET)  mg per tablet 0 Starting on: 12/1/2017 Sig: Take one tab po up to four times per day prn severe pain  
 Class: Print Prescriptions Sent to Pharmacy Refills  
 cyclobenzaprine (FLEXERIL) 10 mg tablet 5 Sig: Take 1 Tab by mouth three (3) times daily as needed for Muscle Spasm(s).   
 Class: Normal  
 Pharmacy: [facility] [address] [place] [address], [address] AT [address] [place] [address] #: [phone] Route: Oral  
  
Follow-up Instructions Return in about 4 months (around 12/28/2017). Patient Instructions Plan: 
Continue same medications as prescribed for chronic pain Geniculate nerve blocks/radiofrequency ablation have been shown to give up to a year of pain relief for knee pain due to arthritis. There is a anesthesiologist with [facility] that performs these injections We can refer you to Dr. Mattie Gallegos for aspiration of the effusion and possible cortisone injections Follow up in 4 months or sooner if needed Regular exercise and attention to emotional health and diet remain the most effective ways to treat chronic pain of all kinds You may contact me with questions or concerns through [address] Introducing [facility]! Jaz Baca introduces 1CLICK patient portal. Now you can access parts of your medical record, email your doctor's office, and request medication refills online. 1. In your internet browser, go to [email or web address] 2. Click on the First Time User? Click Here link in the Sign In box. You will see the New Member Sign Up page. 3. Enter your 1CLICK Access Code exactly as it appears below. You will not need to use this code after youve completed the sign-up process. If you do not sign up before the expiration date, you must request a new code. · 1CLICK Access Code: [email or web address] Expires: 9/4/2017  5:26 PM 
 
4. Enter the last four digits of your Social Security Number (xxxx) and Date of Birth (mm/dd/yyyy) as indicated and click Submit. You will be taken to the next sign-up page. 5. Create a 1CLICK ID. This will be your 1CLICK login ID and cannot be changed, so think of one that is secure and easy to remember. 6. Create a 1CLICK password. You can change your password at any time. 7. Enter your Password Reset Question and Answer.  This can be used at a later time if you forget your password. 8. Enter your e-mail address. You will receive e-mail notification when new information is available in [address] 9. Click Sign Up. You can now view and download portions of your medical record. 10. Click the Download Summary menu link to download a portable copy of your medical information. If you have questions, please visit the Frequently Asked Questions section of the eTect website. Remember, eTect is NOT to be used for urgent needs. For medical emergencies, dial 911. Now available from your iPhone and Android! Please provide this summary of care documentation to your next provider. Your primary care clinician is listed as Razia Woodall. If you have any questions after today's visit, please call [phone].

## 2017-08-28 NOTE — PROGRESS NOTES
HISTORY OF PRESENT ILLNESS  Antonio Palencia is a 56 y.o. male. The patient presents today for follow up of chronic severe pain involving the lumbar spine attributed to degenerative disc disease, spondylosis, and radiculopathy. He complains of eight week history of left knee swelling and pain that occurred \"out of the blue\" without any provocation, fall, or injury noted. He has long standing history of chondromalacia patella, and had a retinacular release surgery with Dr. Masoud Gamboa in [id number] that was of no benefit. MRI performed in 2008 was normal with the exception of mild to moderate CP. We will refer him to    Antonio Palencia is tolerating medications well, with no untoward side effects noted. He is able to stay more active with less discomfort with these current doses. The patient reports an average of 30% relief with this regimen. Most recent UDS and  were consistent with prescribed medications. Pill counts are appropriate. He is informed of side effects, risks, and benefits of this regimen, and emphasizes that he derives a significant improvement in functionality and quality of life, and notes that non-opioid medications and therapies in the past have not offered significant benefit. We discussed my concern that his pain remains so poorly controlled in spite of his egregiously high doses of opioids. He is not interested in weaning down on his doses. He asserts that they are providing a modicum of pain relief. He is able to continue working full time with his medications. He denies new or worsening insomnia or constipation issues. He denies any falls, injuries, or hospitalizations since the last visit. HPI--see above    ROS  Constitutional: Positive for malaise/fatigue. Negative for chills, diaphoresis and fever. Eyes: Negative for blurred vision and double vision. Respiratory: Negative for cough, shortness of breath and wheezing. Cardiovascular: Positive for leg swelling (knees).  Negative for chest pain and palpitations. Gastrointestinal: Negative for constipation, diarrhea and nausea. Genitourinary: Negative. Musculoskeletal: Positive for joint pain (knees) and myalgias. Negative for falls and neck pain. Physical Exam  Constitutional: He is oriented to person, place, and time. He appears well-developed and well-nourished. No distress. HENT:   Head: Normocephalic. Right Ear: External ear normal.   Left Ear: External ear normal.   Eyes: Conjunctivae and EOM are normal. Pupils are equal, round, and reactive to light. Neck: Normal range of motion. Pulmonary/Chest: Effort normal. No respiratory distress. Musculoskeletal: He exhibits no edema or tenderness. Right knee: He exhibits decreased range of motion. No tenderness (but pain and crepitus) found. Left knee: He exhibits decreased range of motion. No tenderness (but pain and crepitus) found. Mild effusion noted anteriorly       Lumbar back: He exhibits decreased range of motion and pain. He exhibits no tenderness. Neurological: He is alert and oriented to person, place, and time. No cranial nerve deficit (grossly intact). Gait (antalgic) abnormal.   Skin: Skin is warm and dry. Psychiatric: He has a normal mood and affect. His behavior is normal. Judgment and thought content normal.   ASSESSMENT and PLAN    ICD-10-CM ICD-9-CM    1. Chondromalacia patellae, left M22.42 717.7    2. Chronic insomnia F51.04 780.52 ALPRAZolam (XANAX) 1 mg tablet   3. Degeneration of lumbar or lumbosacral intervertebral disc M51.37 722.52 ALPRAZolam (XANAX) 1 mg tablet   4. Lumbosacral radiculopathy M54.17 724.4    5. Chronic bilateral low back pain without sciatica M54.5 724.2     G89.29 338.29    6. Knee effusion, left M25.462 719.06    7.  Arthritis of left knee M17.12 716.96       Plan:  Continue same medications as prescribed for chronic pain  Geniculate nerve blocks/radiofrequency ablation have been shown to give up to a year of pain relief for knee pain due to arthritis.  There is a anesthesiologist with [facility] that performs these procedures  We can refer you to Dr. Henok Houser for aspiration of the effusion and possible cortisone injections  Follow up in 4 months or sooner if needed  Regular exercise and attention to emotional health and diet remain the most effective ways to treat chronic pain of all kinds  You may contact me with questions or concerns through [address]

## 2017-08-28 NOTE — PROGRESS NOTES
Nursing Notes    Patient presents to the office today in follow-up. Patient rates his pain at 7/10 on the numerical pain scale. Reviewed medications with counts as follows:    Rx Date filled Qty Dispensed Pill Count Last Dose Short     Xanax 1mg 08/04/17 45 6 Last night  No    Percocet 10/325mg 08/09/17 120 40 Today  No    Morphine sulfate 100mg ER 08/09/17 90 33 Today  No                             Comments:     POC UDS was not performed in office today    Any new labs or imaging since last appointment? NO    Have you been to an emergency room (ER) or urgent care clinic since your last visit? NO            Have you been hospitalized since your last visit? NO     If yes, where, when, and reason for visit? Have you seen or consulted any other health care providers outside of the [address] [place] [address]  since your last visit? NO     If yes, where, when, and reason for visit? HM deferred to pcp.

## 2017-12-28 ENCOUNTER — TELEPHONE (OUTPATIENT)
Dept: PAIN MANAGEMENT | Age: 47
End: 2017-12-28

## 2017-12-28 NOTE — TELEPHONE ENCOUNTER
Patient called the triage line and stated that his appt was cancelled and he will be out of his medications on this weekend. It is noted that the patient is a maryse rosanna patient and he will need to be seen in the office before he can receive any medications from this office. I called the patient and he stated his appt was cancelled by the office and he did not hear anything from the office in months. The patient is now on his last week of meds, I informed him if he did not hear anything from us for an appt he will have to seek emergent treatment.  The patient verbalized understanding of the phone dexter.

## 2017-12-29 ENCOUNTER — OFFICE VISIT (OUTPATIENT)
Dept: PAIN MANAGEMENT | Age: 47
End: 2017-12-29

## 2017-12-29 VITALS
SYSTOLIC BLOOD PRESSURE: 137 MMHG | BODY MASS INDEX: 29.62 KG/M2 | TEMPERATURE: 98.2 F | HEIGHT: 69 IN | WEIGHT: 200 LBS | HEART RATE: 98 BPM | RESPIRATION RATE: 18 BRPM | DIASTOLIC BLOOD PRESSURE: 87 MMHG

## 2017-12-29 DIAGNOSIS — M54.17 LUMBOSACRAL RADICULOPATHY: ICD-10-CM

## 2017-12-29 DIAGNOSIS — M51.37 DEGENERATION OF LUMBAR OR LUMBOSACRAL INTERVERTEBRAL DISC: Primary | ICD-10-CM

## 2017-12-29 DIAGNOSIS — G89.29 CHRONIC BILATERAL LOW BACK PAIN WITHOUT SCIATICA: ICD-10-CM

## 2017-12-29 DIAGNOSIS — M17.12 ARTHRITIS OF LEFT KNEE: ICD-10-CM

## 2017-12-29 DIAGNOSIS — M22.42 CHONDROMALACIA OF LEFT PATELLA: ICD-10-CM

## 2017-12-29 DIAGNOSIS — M19.91 PRIMARY OSTEOARTHRITIS, UNSPECIFIED SITE: ICD-10-CM

## 2017-12-29 DIAGNOSIS — M54.50 CHRONIC BILATERAL LOW BACK PAIN WITHOUT SCIATICA: ICD-10-CM

## 2017-12-29 DIAGNOSIS — G89.4 CHRONIC PAIN SYNDROME: ICD-10-CM

## 2017-12-29 DIAGNOSIS — Z79.899 ENCOUNTER FOR LONG-TERM (CURRENT) USE OF HIGH-RISK MEDICATION: ICD-10-CM

## 2017-12-29 LAB
ALCOHOL UR POC: NORMAL
AMPHETAMINES UR POC: NORMAL
BARBITURATES UR POC: NORMAL
BENZODIAZEPINES UR POC: NORMAL
BUPRENORPHINE UR POC: NORMAL
CANNABINOIDS UR POC: NORMAL
CARISOPRODOL UR POC: NORMAL
COCAINE UR POC: NORMAL
FENTANYL UR POC: NORMAL
MDMA/ECSTASY UR POC: NORMAL
METHADONE UR POC: NORMAL
METHAMPHETAMINE UR POC: NORMAL
METHYLPHENIDATE UR POC: NORMAL
OPIATES UR POC: NORMAL
OXYCODONE UR POC: NORMAL
PHENCYCLIDINE UR POC: NORMAL
PROPOXYPHENE UR POC: NORMAL
TRAMADOL UR POC: NORMAL
TRICYCLICS UR POC: NORMAL

## 2017-12-29 RX ORDER — CYCLOBENZAPRINE HCL 10 MG
10 TABLET ORAL
Qty: 90 TAB | Refills: 2 | Status: SHIPPED | OUTPATIENT
Start: 2017-12-29 | End: 2018-08-07 | Stop reason: SDUPTHER

## 2017-12-29 RX ORDER — MORPHINE SULFATE 100 MG/1
100 TABLET, FILM COATED, EXTENDED RELEASE ORAL 3 TIMES DAILY
Qty: 90 TAB | Refills: 0 | Status: SHIPPED | OUTPATIENT
Start: 2018-02-27 | End: 2018-03-29 | Stop reason: SDUPTHER

## 2017-12-29 RX ORDER — OXYCODONE AND ACETAMINOPHEN 10; 325 MG/1; MG/1
TABLET ORAL
Qty: 120 TAB | Refills: 0 | Status: SHIPPED | OUTPATIENT
Start: 2018-02-27 | End: 2018-03-29 | Stop reason: SDUPTHER

## 2017-12-29 RX ORDER — BUPROPION HYDROCHLORIDE 150 MG/1
TABLET, EXTENDED RELEASE ORAL
Refills: 2 | COMMUNITY
Start: 2017-12-23 | End: 2018-10-19

## 2017-12-29 RX ORDER — DICLOFENAC SODIUM 10 MG/G
GEL TOPICAL
Refills: 3 | COMMUNITY
Start: 2017-11-24

## 2017-12-29 RX ORDER — OXYCODONE AND ACETAMINOPHEN 10; 325 MG/1; MG/1
TABLET ORAL
Qty: 120 TAB | Refills: 0 | Status: SHIPPED | OUTPATIENT
Start: 2017-12-29 | End: 2018-03-29 | Stop reason: SDUPTHER

## 2017-12-29 RX ORDER — MORPHINE SULFATE 100 MG/1
100 TABLET, FILM COATED, EXTENDED RELEASE ORAL 3 TIMES DAILY
Qty: 90 TAB | Refills: 0 | Status: SHIPPED | OUTPATIENT
Start: 2018-01-28 | End: 2018-03-29 | Stop reason: SDUPTHER

## 2017-12-29 RX ORDER — VARENICLINE TARTRATE 0.5 (11)-1
KIT ORAL
Refills: 0 | COMMUNITY
Start: 2017-11-24 | End: 2018-10-19

## 2017-12-29 RX ORDER — DICYCLOMINE HYDROCHLORIDE 20 MG/1
TABLET ORAL
Refills: 1 | COMMUNITY
Start: 2017-10-15

## 2017-12-29 RX ORDER — BUTALBITAL, ACETAMINOPHEN AND CAFFEINE 50; 325; 40 MG/1; MG/1; MG/1
TABLET ORAL
Refills: 3 | COMMUNITY
Start: 2017-12-15

## 2017-12-29 RX ORDER — OXYCODONE AND ACETAMINOPHEN 10; 325 MG/1; MG/1
TABLET ORAL
Qty: 120 TAB | Refills: 0 | Status: SHIPPED | OUTPATIENT
Start: 2018-01-28 | End: 2018-03-29 | Stop reason: SDUPTHER

## 2017-12-29 RX ORDER — CITALOPRAM 20 MG/1
TABLET, FILM COATED ORAL
Refills: 3 | COMMUNITY
Start: 2017-12-24

## 2017-12-29 RX ORDER — MORPHINE SULFATE 100 MG/1
100 TABLET, FILM COATED, EXTENDED RELEASE ORAL 3 TIMES DAILY
Qty: 90 TAB | Refills: 0 | Status: SHIPPED | OUTPATIENT
Start: 2017-12-29 | End: 2018-03-29 | Stop reason: SDUPTHER

## 2017-12-29 NOTE — PROGRESS NOTES
HISTORY OF PRESENT ILLNESS  Marielena Perez is a 56 y.o. male    Mr. Nereida London presents for follow up of chronic pain due to lumbar stenosis and fibromyalgia and osteoarthritis. He had a lateral release surgery of left knee many years ago. Patient has had physical therapy with good results. He had a history of Cortizone injections for bilateral knees with good results. Dr. Maribel Giles performed an IL-DELMIS at L2/L3 with good results. This is my first visit with this patient. The patient denies any significant changes in his chronic pain since last f/u. He continues to complain of persistent pain in his knees. Severe central canal stenosis that he has so far refused to have surgically addressed has produced progressive worsening of weakness in his legs. This has led to falls, and inability to exercise, which in turn has led to worsening of existing arthritis of the knees and ankles. He reports no falls since last visit. He reports he will see his PCP early next year. He will have is alprazolam 1 mg prescription filled by his PCP in future. Current medication management consists of:morphine  mg tablet, take 1 tablet 3 times daily, Oxycodone/acetaminophen 10/325 mg tablet, take 1 tablet up to 4 times daily as needed, cyclobenzaprine 10 mg tablet, take 1 tablet 3 times daily as needed for muscle spasm  Medications are helping with pain control and quality of life. His pain is 2-3/10 with medication and 9/10 without. Pt describes pain as aching, stabbing, and burning. Aggravating factors include standing, sitting, and walking. Relieved with rest, medication, and avoiding painful activities. The patient reports 70% relief with current medications. Current treatment is helping to improve general activity, mood, walking, sleep, enjoyment of life    Measuring clinical outcomes of chronic pain patients: score 12/28; the lower the number the better the outcome.        Pain Meds and Quality Of Life have been reviewed. Nonpharmacologic therapy and non-opioid pharmacologic therapy were considered. If opioid therapy is prescribed, this is only if the expected benefits are anticipated to outweigh risks. He  is otherwise doing well with no other complaints today. He denies any adverse events including nausea, vomiting, dizziness, increased constipation, hallucinations, or seizures. The patient reports functional improvement and QOL with pain medication. Vitals:    12/29/17 1044   BP: 137/87   Pulse: 98   Resp: 18   Temp: 98.2 °F (36.8 °C)   TempSrc: Oral   Weight: 90.7 kg (200 lb)   Height: 5' 9\" (1.753 m)   PainSc:   6   PainLoc: Knee         Allergies   Allergen Reactions    Naprosyn [Naproxen] Other (comments)     Upset stomach       Past Surgical History:   Procedure Laterality Date    HX ORTHOPAEDIC      lateral release (L) knee       ROS   Constitutional: Positive for malaise/fatigue. Negative for chills, diaphoresis and fever. Eyes: Negative for blurred vision and double vision. Respiratory: Negative for cough, shortness of breath and wheezing. Cardiovascular: Positive for leg swelling (knees). Negative for chest pain and palpitations. Gastrointestinal: Negative for constipation, diarrhea and nausea. Genitourinary: Negative. Musculoskeletal: Positive for joint pain (knees) and myalgias. Negative for falls and neck pain. Physical Exam   Constitutional: He is oriented to person, place, and time. He appears well-developed and well-nourished. No distress. HENT:   Head: Normocephalic. Right Ear: External ear normal.   Left Ear: External ear normal.   Eyes: Conjunctivae and EOM are normal. Pupils are equal, round, and reactive to light. Neck: Normal range of motion. Pulmonary/Chest: Effort normal. No respiratory distress. Musculoskeletal: He exhibits no edema or tenderness. Right knee: He exhibits decreased range of motion. No tenderness (but pain and crepitus) found. Left knee: He exhibits decreased range of motion. No tenderness (but pain and crepitus) found. Mild effusion noted anteriorly       Lumbar back: He exhibits decreased range of motion and pain. He exhibits no tenderness. Neurological: He is alert and oriented to person, place, and time. No cranial nerve deficit (grossly intact). Gait (antalgic) abnormal.   Skin: Skin is warm and dry. Psychiatric: He has a normal mood and affect. His behavior is normal. Judgment and thought content normal    ASSESSMENT:    1. Degeneration of lumbar or lumbosacral intervertebral disc    2. Lumbosacral radiculopathy    3. Chondromalacia of left patella    4. Primary osteoarthritis, unspecified site    5. Chronic pain syndrome    6. Arthritis of left knee    7. Chronic bilateral low back pain without sciatica    8. Encounter for long-term (current) use of high-risk medication        [place] Prescription Monitoring Program was reviewed which does not demonstrate aberrancies and/or inconsistencies with regard to the historical prescribing of controlled medications to this patient by other providers. Medications were brought to visit today. Pill count was appropriate. When possible, non-drug therapy for chronic pain should be used as a first-line treatment. Physical therapy exercise regimens, chiropractic manipulation, meditation relaxation techniques, cognitive behavior therapy, acupuncture, yoga, Luis Miguel Chi,  transcutaneous electrical nerve stimulation (TENS), and application of moist heat can help alleviate pain . Explained that realistic expectations and goals with chronic pain management are to maximize function and minimize pain with the understanding that limitations will exist both in the extent of relief that she may achieve, as well as thresholds of mg strengths that we will not exceed. Our role is to help the patient better cope with chronic pain utilizng a multimodal approach.       The patients condition and plan were discussed. All questions were answered. The patient agrees with the plan. PLAN / Pt Instructions:  1. Continue current plan with no evidence of addiction or diversion. 2. Stable on current medication without adverse events. 3. Refill morphine  mg tablet, take 1 tablet 3 times daily  4. Refill Oxycodone/acetaminophen 10/325 mg tablet, take 1 tablet up to 4 times daily as needed  5. Refill cyclobenzaprine 10 mg tablet, take 1 tablet 3 times daily as needed for muscle spasm  6. Discussed risks of addiction, dependency, and opioid induced hyperalgesia. 7. Reviewed with patient benefits of home exercise, and lifestyle changes to assist in self-management of symptoms. 8. Return to clinic in 3 months      Prescription monitoring program reviewed. The patient  should keep track of total Tylenol intake and make sure liver function tests have been checked with primary care physician. POC UDS today. Pain medications prescribed with the objective of pain relief and improved physical and psychosocial function in this patient. DISPOSITION  · Counseled patient on proper use of prescribed medications. · Counseled patient about chronic medical conditions and their relationship to anxiety and depression and recommended mental health support as needed. · Reviewed with patient self-help tools, home exercise, and lifestyle changes to assist the patient in self-management of symptoms. · Reviewed with patient the treatment plan, goals of treatment plan, and limitations of treatment plan, to include the potential for side effects from medications and procedures. If side effects occur, it is the responsibility of the patient to inform the clinic so that a change in the treatment plan can be made in a safe manner. The patient is advised that stopping prescribed medication may cause an increase in symptoms and possible medication withdrawal symptoms.  The patient is informed an emergency room evaluation may be necessary if this occurs. Spent 25 minutes with patient today reviewing the treatment plan, goals of treatment plan, and limitations of the treatment plan, to include the potential for side effects from medications and procedures. More than 50% of the visit time was spent counseling the patient. Arpan Mccoy PA-C 12/29/2017        Note: Please excuse any typographical errors. Voice recognition software was used for this note and may cause mistakes.

## 2017-12-29 NOTE — PROGRESS NOTES
Nursing Notes    Patient presents to the office today in follow-up. Patient rates his pain at 6/10 on the numerical pain scale. Reviewed medications with counts as follows:    Rx Date filled Qty Dispensed Pill Count Last Dose Short   Morphine 100 mg 11/30/17 90 5 This a.m no   Percocet  mg 11/30/17 120 0 This a.m no                                POC UDS was performed in office today    Any new labs or imaging since last appointment? NO    Have you been to an emergency room (ER) or urgent care clinic since your last visit? NO            Have you been hospitalized since your last visit? NO     If yes, where, when, and reason for visit? Have you seen or consulted any other health care providers outside of the [address] [place] [address]  since your last visit? YES, PCP     If yes, where, when, and reason for visit? HM deferred to pcp.

## 2017-12-29 NOTE — MR AVS SNAPSHOT
Visit Information Date & Time Provider Department Dept. Phone Encounter #  
 12/29/2017 10:40 AM Jannie Duncan [address] [place] [address] for Pain Management [id number] Follow-up Instructions Return in about 3 months (around 3/29/2018). Upcoming Health Maintenance Date Due Pneumococcal 19-64 Medium Risk (1 of 1 - PPSV23) 8/14/1990 DTaP/Tdap/Td series (1 - Tdap) 8/14/1992 Influenza Age 5 to Adult 8/1/2017 Allergies as of 12/29/2017  Review Complete On: 12/29/2017 By: Severiano Bowens, PA-C Severity Noted Reaction Type Reactions Naprosyn [Naproxen]    Other (comments) Upset stomach Current Immunizations  Never Reviewed No immunizations on file. Not reviewed this visit You Were Diagnosed With   
  
 Codes Comments Degeneration of lumbar or lumbosacral intervertebral disc    -  Primary ICD-10-CM: M51.37 
ICD-9-CM: 722.52 Lumbosacral radiculopathy     ICD-10-CM: M54.17 ICD-9-CM: 724.4 Chondromalacia of left patella     ICD-10-CM: M22.42 
ICD-9-CM: 717.7 Primary osteoarthritis, unspecified site     ICD-10-CM: M19.91 
ICD-9-CM: 715.10 Chronic pain syndrome     ICD-10-CM: G89.4 ICD-9-CM: 338.4 Arthritis of left knee     ICD-10-CM: M17.12 
ICD-9-CM: 716.96 Chronic bilateral low back pain without sciatica     ICD-10-CM: M54.5, G89.29 ICD-9-CM: 724.2, 338.29 Encounter for long-term (current) use of high-risk medication     ICD-10-CM: Z79.899 ICD-9-CM: V58.69 Vitals BP Pulse Temp Resp Height(growth percentile) Weight(growth percentile) 137/87 (BP 1 Location: Right arm, BP Patient Position: Sitting) 98 98.2 °F (36.8 °C) (Oral) 18 5' 9\" (1.753 m) 200 lb (90.7 kg) BMI Smoking Status 29.53 kg/m2 Current Every Day Smoker BMI and BSA Data Body Mass Index Body Surface Area  
 29.53 kg/m 2 2.1 m 2 Preferred Pharmacy Pharmacy Name Phone [facility], [address] [phone] Your Updated Medication List  
  
   
This list is accurate as of: 12/29/17 11:19 AM.  Always use your most recent med list.  
  
  
  
  
 ALPRAZolam 1 mg tablet Commonly known as:  Della Howie Take 1 tab by mouth at bedtime for insomnia. May take a second tab for severe insomnia. amLODIPine 5 mg tablet Commonly known as:  Bela Pace Take 5 mg by mouth daily. butalbital-acetaminophen-caffeine -40 mg per tablet Commonly known as:  Lavella Amble TK 1 T PO Q 4 H PRN. NO MORE THAN 6 TS IN 24 HOURS  
  
 CHANTIX STARTING MONTH BOX 0.5 mg (11)- 1 mg (42) Dspk Generic drug:  varenicline TK AS DIRECTED  
  
 citalopram 20 mg tablet Commonly known as:  CELEXA  
  
 cyclobenzaprine 10 mg tablet Commonly known as:  FLEXERIL Take 1 Tab by mouth three (3) times daily as needed for Muscle Spasm(s). Indications: Muscle Spasm  
  
 diclofenac 1 % Gel Commonly known as:  VOLTAREN  
  
 dicyclomine 20 mg tablet Commonly known as:  BENTYL TK 1 T PO QID  
  
 ibuprofen 200 mg tablet Commonly known as:  MOTRIN Take  by mouth. * morphine  mg CR tablet Commonly known as:  MS CONTIN Take 1 Tab by mouth three (3) times daily. Max Daily Amount: 300 mg.  
  
 * morphine  mg CR tablet Commonly known as:  MS CONTIN Take 1 Tab by mouth three (3) times daily. Max Daily Amount: 300 mg.  
  
 * morphine  mg CR tablet Commonly known as:  MS CONTIN Take 1 Tab by mouth three (3) times daily. Max Daily Amount: 300 mg. Start taking on:  1/28/2018 * morphine  mg CR tablet Commonly known as:  MS CONTIN Take 1 Tab by mouth three (3) times daily. Max Daily Amount: 300 mg. Start taking on:  2/27/2018  
  
 naloxone 4 mg/actuation nasal spray Commonly known as:  NARCAN  
4 mg by Nasal route as needed for up to 2 doses.  Indications: OPIOID TOXICITY  
  
 * oxyCODONE-acetaminophen  mg per tablet Commonly known as:  PERCOCET Take one tab po up to four times per day prn severe pain * oxyCODONE-acetaminophen  mg per tablet Commonly known as:  PERCOCET Take one tab po up to four times per day prn severe pain * oxyCODONE-acetaminophen  mg per tablet Commonly known as:  PERCOCET Take one tab po up to four times per day prn severe pain Start taking on:  1/28/2018 * oxyCODONE-acetaminophen  mg per tablet Commonly known as:  PERCOCET Take one tab po up to four times per day prn severe pain Start taking on:  2/27/2018 * WELLBUTRIN  mg SR tablet Generic drug:  buPROPion SR Take  by mouth two (2) times a day. * buPROPion  mg ER tablet Commonly known as:  ZYBAN,BUPROBAN  
  
 * Notice: This list has 10 medication(s) that are the same as other medications prescribed for you. Read the directions carefully, and ask your doctor or other care provider to review them with you. Prescriptions Printed Refills  
 morphine CR (MS CONTIN) 100 mg CR tablet 0 Starting on: 2/27/2018 Sig: Take 1 Tab by mouth three (3) times daily. Max Daily Amount: 300 mg. Class: Print Route: Oral  
 morphine CR (MS CONTIN) 100 mg CR tablet 0 Starting on: 1/28/2018 Sig: Take 1 Tab by mouth three (3) times daily. Max Daily Amount: 300 mg. Class: Print Route: Oral  
 morphine CR (MS CONTIN) 100 mg CR tablet 0 Sig: Take 1 Tab by mouth three (3) times daily. Max Daily Amount: 300 mg. Class: Print Route: Oral  
 oxyCODONE-acetaminophen (PERCOCET)  mg per tablet 0 Starting on: 2/27/2018 Sig: Take one tab po up to four times per day prn severe pain  
 Class: Print  
 oxyCODONE-acetaminophen (PERCOCET)  mg per tablet 0 Starting on: 1/28/2018 Sig: Take one tab po up to four times per day prn severe pain  
 Class: Print oxyCODONE-acetaminophen (PERCOCET)  mg per tablet 0 Sig: Take one tab po up to four times per day prn severe pain  
 Class: Print Prescriptions Sent to Pharmacy Refills  
 cyclobenzaprine (FLEXERIL) 10 mg tablet 2 Sig: Take 1 Tab by mouth three (3) times daily as needed for Muscle Spasm(s). Indications: Muscle Spasm Class: Normal  
 Pharmacy: [facility] [address] [place] [address], [address] [place] [address] #: [phone] Route: Oral  
  
Follow-up Instructions Return in about 3 months (around 3/29/2018). Introducing [facility]! [employer] introduces Needium patient portal. Now you can access parts of your medical record, email your doctor's office, and request medication refills online. 1. In your internet browser, go to [email or web address] 2. Click on the First Time User? Click Here link in the Sign In box. You will see the New Member Sign Up page. 3. Enter your Needium Access Code exactly as it appears below. You will not need to use this code after youve completed the sign-up process. If you do not sign up before the expiration date, you must request a new code. · Needium Access Code: [email or web address] Expires: 3/29/2018 11:19 AM 
 
4. Enter the last four digits of your Social Security Number (xxxx) and Date of Birth (mm/dd/yyyy) as indicated and click Submit. You will be taken to the next sign-up page. 5. Create a [employer] ID. This will be your Needium login ID and cannot be changed, so think of one that is secure and easy to remember. 6. Create a Needium password. You can change your password at any time. 7. Enter your Password Reset Question and Answer. This can be used at a later time if you forget your password. 8. Enter your e-mail address. You will receive e-mail notification when new information is available in [address] 9. Click Sign Up. You can now view and download portions of your medical record. 10. Click the Download Summary menu link to download a portable copy of your medical information. If you have questions, please visit the Frequently Asked Questions section of the [employer] website. Remember, [employer] is NOT to be used for urgent needs. For medical emergencies, dial 911. Now available from your iPhone and Android! Please provide this summary of care documentation to your next provider. Your primary care clinician is listed as Jeanette Fonseca. If you have any questions after today's visit, please call [phone].

## 2018-03-29 ENCOUNTER — OFFICE VISIT (OUTPATIENT)
Dept: PAIN MANAGEMENT | Age: 48
End: 2018-03-29

## 2018-03-29 VITALS
HEART RATE: 99 BPM | SYSTOLIC BLOOD PRESSURE: 152 MMHG | HEIGHT: 69 IN | TEMPERATURE: 98.6 F | BODY MASS INDEX: 29.62 KG/M2 | RESPIRATION RATE: 18 BRPM | WEIGHT: 200 LBS | DIASTOLIC BLOOD PRESSURE: 86 MMHG

## 2018-03-29 DIAGNOSIS — M51.37 DEGENERATION OF LUMBAR OR LUMBOSACRAL INTERVERTEBRAL DISC: ICD-10-CM

## 2018-03-29 DIAGNOSIS — M54.50 CHRONIC BILATERAL LOW BACK PAIN WITHOUT SCIATICA: ICD-10-CM

## 2018-03-29 DIAGNOSIS — G89.4 CHRONIC PAIN SYNDROME: Primary | ICD-10-CM

## 2018-03-29 DIAGNOSIS — M54.17 LUMBOSACRAL RADICULOPATHY: ICD-10-CM

## 2018-03-29 DIAGNOSIS — G89.29 CHRONIC BILATERAL LOW BACK PAIN WITHOUT SCIATICA: ICD-10-CM

## 2018-03-29 DIAGNOSIS — M25.569 ARTHRALGIA OF LOWER LEG, UNSPECIFIED LATERALITY: ICD-10-CM

## 2018-03-29 DIAGNOSIS — M19.91 PRIMARY OSTEOARTHRITIS, UNSPECIFIED SITE: ICD-10-CM

## 2018-03-29 RX ORDER — MORPHINE SULFATE 100 MG/1
100 TABLET, FILM COATED, EXTENDED RELEASE ORAL 3 TIMES DAILY
Qty: 90 TAB | Refills: 0 | Status: SHIPPED | OUTPATIENT
Start: 2018-04-28 | End: 2018-06-13 | Stop reason: SDUPTHER

## 2018-03-29 RX ORDER — OXYCODONE AND ACETAMINOPHEN 10; 325 MG/1; MG/1
TABLET ORAL
Qty: 120 TAB | Refills: 0 | Status: SHIPPED | OUTPATIENT
Start: 2018-05-27 | End: 2018-06-13 | Stop reason: SDUPTHER

## 2018-03-29 RX ORDER — MORPHINE SULFATE 100 MG/1
100 TABLET, FILM COATED, EXTENDED RELEASE ORAL 3 TIMES DAILY
Qty: 90 TAB | Refills: 0 | Status: SHIPPED | OUTPATIENT
Start: 2018-03-29 | End: 2018-06-13 | Stop reason: SDUPTHER

## 2018-03-29 RX ORDER — MORPHINE SULFATE 100 MG/1
100 TABLET, FILM COATED, EXTENDED RELEASE ORAL 3 TIMES DAILY
Qty: 90 TAB | Refills: 0 | Status: SHIPPED | OUTPATIENT
Start: 2018-05-27 | End: 2018-06-13 | Stop reason: SDUPTHER

## 2018-03-29 RX ORDER — OXYCODONE AND ACETAMINOPHEN 10; 325 MG/1; MG/1
TABLET ORAL
Qty: 120 TAB | Refills: 0 | Status: SHIPPED | OUTPATIENT
Start: 2018-03-29 | End: 2018-06-13 | Stop reason: SDUPTHER

## 2018-03-29 RX ORDER — OXYCODONE AND ACETAMINOPHEN 10; 325 MG/1; MG/1
TABLET ORAL
Qty: 120 TAB | Refills: 0 | Status: SHIPPED | OUTPATIENT
Start: 2018-04-28 | End: 2018-06-13 | Stop reason: SDUPTHER

## 2018-03-29 NOTE — PROGRESS NOTES
Nursing Notes    Patient presents to the office today in follow-up. Patient rates his pain at 7/10 on the numerical pain scale. Reviewed medications with counts as follows:    Rx Date filled Qty Dispensed Pill Count Last Dose Short   Percocet  mg 02/26/18 120 3 today no   MOrphine 100 mg 02/26/18 90 0 today no                                POC UDS was not performed in office today    Any new labs or imaging since last appointment? NO    Have you been to an emergency room (ER) or urgent care clinic since your last visit? NO            Have you been hospitalized since your last visit? NO     If yes, where, when, and reason for visit? Have you seen or consulted any other health care providers outside of the [place] [address]  since your last visit? NO     If yes, where, when, and reason for visit? HM deferred to pcp.

## 2018-03-29 NOTE — PROGRESS NOTES
HISTORY OF PRESENT ILLNESS  Danica See is a 56 y.o. male    MrBj Moser presents for follow up of chronic pain due to lumbar stenosis and fibromyalgia and osteoarthritis. He had a lateral release surgery of left knee many years ago. Patient has had physical therapy with good results. He had a history of Cortizone injections for bilateral knees with good results. Dr. Laurita Joseph performed an IL-DELMIS at L2/L3 with good results.     The patient denies any significant changes in his chronic pain since last f/u. He continues to complain of persistent pain in his knees. Severe central canal stenosis that causes weakness in his legs. This has led to falls, and inability to exercise, which in turn has led to worsening of existing arthritis of the knees and ankles. He reports no falls since last visit. He reports he will schedule to see his PCP in next couple of months. He will have is alprazolam 1 mg prescription filled by his PCP in future.        Current medication management consists of:morphine  mg tablet, take 1 tablet 3 times daily, Oxycodone/acetaminophen 10/325 mg tablet, take 1 tablet up to 4 times daily as needed, cyclobenzaprine 10 mg tablet, take 1 tablet 3 times daily as needed for muscle spasm  Medications are helping with pain control and quality of life. [place] Mourning is 2-3/10 with medication and 9/10 without.  Pt describes pain as aching, stabbing, and burning. Aggravating factors include standing, sitting, and walking. Relieved with rest, medication, and avoiding painful activities. The patient reports 70% relief with current medications.   Current treatment is helping to improve general activity, mood, walking, sleep, enjoyment of life     Measuring clinical outcomes of chronic pain patients: score 13/28; the lower the number the better the outcome. Pain Meds and Quality Of Life have been reviewed. Nonpharmacologic therapy and non-opioid pharmacologic therapy were considered.   If opioid therapy is prescribed, this is only if the expected benefits are anticipated to outweigh risks. He  is otherwise doing well with no other complaints today. He denies any adverse events including nausea, vomiting, dizziness, increased constipation, hallucinations, or seizures. The patient reports functional improvement and QOL with pain medication. Vitals:    03/29/18 1453 03/29/18 1457   BP: 152/90 152/86   Pulse: (!) 103 99   Resp: 18    Temp: 98.6 °F (37 °C)    TempSrc: Oral    Weight: 90.7 kg (200 lb)    Height: 5' 9\" (1.753 m)    PainSc:   7    PainLoc: Knee          Allergies   Allergen Reactions    Naprosyn [Naproxen] Other (comments)     Upset stomach       Past Surgical History:   Procedure Laterality Date    HX ORTHOPAEDIC      lateral release (L) knee       ROS   Constitutional: Positive for malaise/fatigue. Negative for chills, diaphoresis and fever. Eyes: Negative for blurred vision and double vision. Respiratory: Negative for cough, shortness of breath and wheezing.    Cardiovascular: Positive for leg swelling (knees). Negative for chest pain and palpitations. Gastrointestinal: Negative for constipation, diarrhea and nausea. Genitourinary: Negative.    Musculoskeletal: Positive for joint pain (knees) and myalgias. Negative for falls and neck pain. Physical Exam   Constitutional: He is oriented to person, place, and time. He appears well-developed and well-nourished. No distress. HENT:   Head: Normocephalic. Right Ear: External ear normal.   Left Ear: External ear normal.   Eyes: Conjunctivae and EOM are normal. Pupils are equal, round, and reactive to light. Neck: Normal range of motion. Pulmonary/Chest: Effort normal. No respiratory distress. Musculoskeletal: He exhibits no edema or tenderness.        Right knee: He exhibits decreased range of motion. No tenderness (but pain and crepitus) found.        Left knee: He exhibits decreased range of motion.  No tenderness (but pain and crepitus) found. Mild effusion noted anteriorly       Lumbar back: He exhibits decreased range of motion and pain. He exhibits no tenderness. Neurological: He is alert and oriented to person, place, and time. No cranial nerve deficit (grossly intact). Gait (antalgic) abnormal.   Skin: Skin is warm and dry. Psychiatric: He has a normal mood and affect. His behavior is normal. Judgment and thought content normal    ASSESSMENT:    1. Chronic pain syndrome    2. Primary osteoarthritis, unspecified site    3. Degeneration of lumbar or lumbosacral intervertebral disc    4. Lumbosacral radiculopathy    5. Arthralgia of lower leg, unspecified laterality    6. Chronic bilateral low back pain without sciatica          [facility] Prescription Monitoring Program was reviewed which does not demonstrate aberrancies and/or inconsistencies with regard to the historical prescribing of controlled medications to this patient by other providers. Medications were brought to visit today. Pill count was appropriate. When possible, non-drug therapy for chronic pain should be used as a first-line treatment. Physical therapy exercise regimens, chiropractic manipulation, meditation relaxation techniques, cognitive behavior therapy, acupuncture, yoga, Luis Miguel Chi,  transcutaneous electrical nerve stimulation (TENS), and application of moist heat can help alleviate pain . Explained that realistic expectations and goals with chronic pain management are to maximize function and minimize pain with the understanding that limitations will exist both in the extent of relief that she may achieve, as well as thresholds of mg strengths that we will not exceed. Our role is to help the patient better cope with chronic pain utilizng a multimodal approach. The patients condition and plan were discussed. All questions were answered. The patient agrees with the plan. PLAN / Pt Instructions:  1.  Continue current plan with no evidence of addiction or diversion. 2. Stable on current medication without adverse events. 3. Refill morphine  mg tablet, take 1 tablet 3 times daily  4. Refill oxycodone/acetaminophen 10/325 mg tablet, take 1 tablet up to 4 times daily as needed  5. Continue cyclobenzaprine 10 mg tablet, take 1 tablet 3 times daily as needed for muscle spasm  6. Discussed risks of addiction, dependency, and opioid induced hyperalgesia. 7. Reviewed with patient benefits of home exercise, and lifestyle changes to assist in self-management of symptoms. 8. Return to clinic in 3 months        Medications Ordered Today   Medications    morphine CR (MS CONTIN) 100 mg CR tablet     Sig: Take 1 Tab by mouth three (3) times daily. Max Daily Amount: 300 mg. Dispense:  90 Tab     Refill:  0    morphine CR (MS CONTIN) 100 mg CR tablet     Sig: Take 1 Tab by mouth three (3) times daily. Max Daily Amount: 300 mg. Dispense:  90 Tab     Refill:  0    morphine CR (MS CONTIN) 100 mg CR tablet     Sig: Take 1 Tab by mouth three (3) times daily. Max Daily Amount: 300 mg. Dispense:  90 Tab     Refill:  0    oxyCODONE-acetaminophen (PERCOCET)  mg per tablet     Sig: Take one tab po up to four times per day prn severe pain     Dispense:  120 Tab     Refill:  0    oxyCODONE-acetaminophen (PERCOCET)  mg per tablet     Sig: Take one tab po up to four times per day prn severe pain     Dispense:  120 Tab     Refill:  0    oxyCODONE-acetaminophen (PERCOCET)  mg per tablet     Sig: Take one tab po up to four times per day prn severe pain     Dispense:  120 Tab     Refill:  0         Prescription monitoring program reviewed. The patient  should keep track of total Tylenol intake and make sure liver function tests have been checked with primary care physician. Pain medications prescribed with the objective of pain relief and improved physical and psychosocial function in this patient.     DISPOSITION  · Counseled patient on proper use of prescribed medications. · Counseled patient about chronic medical conditions and their relationship to anxiety and depression and recommended mental health support as needed. · Reviewed with patient self-help tools, home exercise, and lifestyle changes to assist the patient in self-management of symptoms. · Reviewed with patient the treatment plan, goals of treatment plan, and limitations of treatment plan, to include the potential for side effects from medications and procedures. If side effects occur, it is the responsibility of the patient to inform the clinic so that a change in the treatment plan can be made in a safe manner. The patient is advised that stopping prescribed medication may cause an increase in symptoms and possible medication withdrawal symptoms. The patient is informed an emergency room evaluation may be necessary if this occurs. Spent 25 minutes with patient today reviewing the treatment plan, goals of treatment plan, and limitations of the treatment plan, to include the potential for side effects from medications and procedures. More than 50% of the visit time was spent counseling the patient. John Paulino PA-C 3/29/2018        Note: Please excuse any typographical errors. Voice recognition software was used for this note and may cause mistakes.

## 2018-03-29 NOTE — MR AVS SNAPSHOT
[address] [facility] [address] 
[phone] Patient: Christal Alexis MRN: [id number] Alley Alatorreedler Visit Information Date & Time Provider Department Dept. Phone Encounter #  
 3/29/2018  2:20 PM Marcel Gonzalez [address] [facility] [phone] Upcoming Health Maintenance Date Due Pneumococcal 19-64 Medium Risk (1 of 1 - PPSV23) 8/14/1990 DTaP/Tdap/Td series (1 - Tdap) 8/14/1992 Influenza Age 5 to Adult 8/1/2017 Allergies as of 3/29/2018  Review Complete On: 3/29/2018 By: Abel Teixeira PA-C Severity Noted Reaction Type Reactions Naprosyn [Naproxen]    Other (comments) Upset stomach Current Immunizations  Never Reviewed No immunizations on file. Not reviewed this visit You Were Diagnosed With   
  
 Codes Comments Chronic pain syndrome    -  Primary ICD-10-CM: G89.4 ICD-9-CM: 338.4 Primary osteoarthritis, unspecified site     ICD-10-CM: M19.91 
ICD-9-CM: 715.10 Degeneration of lumbar or lumbosacral intervertebral disc     ICD-10-CM: M51.37 
ICD-9-CM: 722.52 Lumbosacral radiculopathy     ICD-10-CM: M54.17 ICD-9-CM: 724.4 Arthralgia of lower leg, unspecified laterality     ICD-10-CM: M25.569 ICD-9-CM: 719.46 Chronic bilateral low back pain without sciatica     ICD-10-CM: M54.5, G89.29 ICD-9-CM: 724.2, 338.29 Vitals BP Pulse Temp Resp Height(growth percentile) Weight(growth percentile) 152/86 (BP 1 Location: Left arm, BP Patient Position: Sitting) 99 98.6 °F (37 °C) (Oral) 18 5' 9\" (1.753 m) 200 lb (90.7 kg) BMI Smoking Status 29.53 kg/m2 Current Every Day Smoker Vitals History BMI and BSA Data Body Mass Index Body Surface Area  
 29.53 kg/m 2 2.1 m 2 Preferred Pharmacy Pharmacy Name Phone  [place], [address] [facility]/Azeem Knight [phone] Your Updated Medication List  
  
   
This list is accurate as of 3/29/18  3:32 PM.  Always use your most recent med list.  
  
  
  
  
 ALPRAZolam 1 mg tablet Commonly known as:  Lugenia Mohs Take 1 tab by mouth at bedtime for insomnia. May take a second tab for severe insomnia. amLODIPine 5 mg tablet Commonly known as:  Macey Chapman Take 5 mg by mouth daily. butalbital-acetaminophen-caffeine -40 mg per tablet Commonly known as:  Loreta Furnace TK 1 T PO Q 4 H PRN. NO MORE THAN 6 TS IN 24 HOURS  
  
 CHANTIX STARTING MONTH BOX 0.5 mg (11)- 1 mg (42) Dspk Generic drug:  varenicline TK AS DIRECTED  
  
 citalopram 20 mg tablet Commonly known as:  CELEXA  
  
 cyclobenzaprine 10 mg tablet Commonly known as:  FLEXERIL Take 1 Tab by mouth three (3) times daily as needed for Muscle Spasm(s). Indications: Muscle Spasm  
  
 diclofenac 1 % Gel Commonly known as:  VOLTAREN  
  
 dicyclomine 20 mg tablet Commonly known as:  BENTYL TK 1 T PO QID  
  
 ibuprofen 200 mg tablet Commonly known as:  MOTRIN Take  by mouth. * morphine  mg CR tablet Commonly known as:  MS CONTIN Take 1 Tab by mouth three (3) times daily. Max Daily Amount: 300 mg.  
  
 * morphine  mg CR tablet Commonly known as:  MS CONTIN Take 1 Tab by mouth three (3) times daily. Max Daily Amount: 300 mg.  
  
 * morphine  mg CR tablet Commonly known as:  MS CONTIN Take 1 Tab by mouth three (3) times daily. Max Daily Amount: 300 mg. Start taking on:  4/28/2018 * morphine  mg CR tablet Commonly known as:  MS CONTIN Take 1 Tab by mouth three (3) times daily. Max Daily Amount: 300 mg. Start taking on:  5/27/2018  
  
 naloxone 4 mg/actuation nasal spray Commonly known as:  NARCAN  
4 mg by Nasal route as needed for up to 2 doses. Indications: OPIOID TOXICITY  
  
 * oxyCODONE-acetaminophen  mg per tablet Commonly known as:  PERCOCET Take one tab po up to four times per day prn severe pain * oxyCODONE-acetaminophen  mg per tablet Commonly known as:  PERCOCET Take one tab po up to four times per day prn severe pain * oxyCODONE-acetaminophen  mg per tablet Commonly known as:  PERCOCET Take one tab po up to four times per day prn severe pain Start taking on:  4/28/2018 * oxyCODONE-acetaminophen  mg per tablet Commonly known as:  PERCOCET Take one tab po up to four times per day prn severe pain Start taking on:  5/27/2018 * WELLBUTRIN  mg SR tablet Generic drug:  buPROPion SR Take  by mouth two (2) times a day. * buPROPion  mg ER tablet Commonly known as:  ZYBPAXTONBUPROBAN  
  
 * Notice: This list has 10 medication(s) that are the same as other medications prescribed for you. Read the directions carefully, and ask your doctor or other care provider to review them with you. Prescriptions Printed Refills  
 morphine CR (MS CONTIN) 100 mg CR tablet 0 Starting on: 5/27/2018 Sig: Take 1 Tab by mouth three (3) times daily. Max Daily Amount: 300 mg. Class: Print Route: Oral  
 morphine CR (MS CONTIN) 100 mg CR tablet 0 Starting on: 4/28/2018 Sig: Take 1 Tab by mouth three (3) times daily. Max Daily Amount: 300 mg. Class: Print Route: Oral  
 morphine CR (MS CONTIN) 100 mg CR tablet 0 Sig: Take 1 Tab by mouth three (3) times daily. Max Daily Amount: 300 mg. Class: Print Route: Oral  
 oxyCODONE-acetaminophen (PERCOCET)  mg per tablet 0 Starting on: 5/27/2018 Sig: Take one tab po up to four times per day prn severe pain  
 Class: Print  
 oxyCODONE-acetaminophen (PERCOCET)  mg per tablet 0 Starting on: 4/28/2018  Sig: Take one tab po up to four times per day prn severe pain  
 Class: Print  
 oxyCODONE-acetaminophen (PERCOCET)  mg per tablet 0  
 Sig: Take one tab po up to four times per day prn severe pain  
 Class: Print Introducing [facility]! [facility] introduces [employer]ho patient portal. Now you can access parts of your medical record, email your doctor's office, and request medication refills online. 1. In your internet browser, go to [email or web address] 2. Click on the First Time User? Click Here link in the Sign In box. You will see the New Member Sign Up page. 3. Enter your Sapho Access Code exactly as it appears below. You will not need to use this code after youve completed the sign-up process. If you do not sign up before the expiration date, you must request a new code. · [employer]apho Access Code: [email or web address] Expires: 6/27/2018  3:32 PM 
 
4. Enter the last four digits of your Social Security Number (xxxx) and Date of Birth (mm/dd/yyyy) as indicated and click Submit. You will be taken to the next sign-up page. 5. Create a Sapho ID. This will be your Sapho login ID and cannot be changed, so think of one that is secure and easy to remember. 6. Create a Sapho password. You can change your password at any time. 7. Enter your Password Reset Question and Answer. This can be used at a later time if you forget your password. 8. Enter your e-mail address. You will receive e-mail notification when new information is available in [address]. 9. Click Sign Up. You can now view and download portions of your medical record. 10. Click the Download Summary menu link to download a portable copy of your medical information. If you have questions, please visit the Frequently Asked Questions section of the Sapho website. Remember, [employer]ho is NOT to be used for urgent needs. For medical emergencies, dial 911. Now available from your iPhone and Android! Please provide this summary of care documentation to your next provider. Your primary care clinician is listed as Agustín Sahu. If you have any questions after today's visit, please call [phone].

## 2018-05-29 ENCOUNTER — TELEPHONE (OUTPATIENT)
Dept: PAIN MANAGEMENT | Age: 48
End: 2018-05-29

## 2018-06-13 ENCOUNTER — OFFICE VISIT (OUTPATIENT)
Dept: PAIN MANAGEMENT | Age: 48
End: 2018-06-13

## 2018-06-13 VITALS
BODY MASS INDEX: 29.62 KG/M2 | SYSTOLIC BLOOD PRESSURE: 147 MMHG | HEART RATE: 103 BPM | DIASTOLIC BLOOD PRESSURE: 88 MMHG | HEIGHT: 69 IN | TEMPERATURE: 97.5 F | WEIGHT: 200 LBS | RESPIRATION RATE: 16 BRPM

## 2018-06-13 DIAGNOSIS — M17.12 ARTHRITIS OF LEFT KNEE: ICD-10-CM

## 2018-06-13 DIAGNOSIS — M19.91 PRIMARY OSTEOARTHRITIS, UNSPECIFIED SITE: ICD-10-CM

## 2018-06-13 DIAGNOSIS — Z79.899 ENCOUNTER FOR LONG-TERM (CURRENT) USE OF HIGH-RISK MEDICATION: ICD-10-CM

## 2018-06-13 DIAGNOSIS — M51.37 DEGENERATION OF LUMBAR OR LUMBOSACRAL INTERVERTEBRAL DISC: ICD-10-CM

## 2018-06-13 DIAGNOSIS — M54.50 CHRONIC BILATERAL LOW BACK PAIN WITHOUT SCIATICA: ICD-10-CM

## 2018-06-13 DIAGNOSIS — M54.17 LUMBOSACRAL RADICULOPATHY: ICD-10-CM

## 2018-06-13 DIAGNOSIS — G89.29 CHRONIC BILATERAL LOW BACK PAIN WITHOUT SCIATICA: ICD-10-CM

## 2018-06-13 DIAGNOSIS — G89.4 CHRONIC PAIN SYNDROME: Primary | ICD-10-CM

## 2018-06-13 DIAGNOSIS — M25.569 ARTHRALGIA OF LOWER LEG, UNSPECIFIED LATERALITY: ICD-10-CM

## 2018-06-13 LAB
ALCOHOL UR POC: NORMAL
AMPHETAMINES UR POC: NEGATIVE
BARBITURATES UR POC: NORMAL
BENZODIAZEPINES UR POC: NEGATIVE
BUPRENORPHINE UR POC: NEGATIVE
CANNABINOIDS UR POC: NEGATIVE
CARISOPRODOL UR POC: NORMAL
COCAINE UR POC: NEGATIVE
FENTANYL UR POC: NORMAL
MDMA/ECSTASY UR POC: NORMAL
METHADONE UR POC: NEGATIVE
METHAMPHETAMINE UR POC: NORMAL
METHYLPHENIDATE UR POC: NORMAL
OPIATES UR POC: NORMAL
OXYCODONE UR POC: NORMAL
PHENCYCLIDINE UR POC: NORMAL
PROPOXYPHENE UR POC: NORMAL
TRAMADOL UR POC: NORMAL
TRICYCLICS UR POC: NORMAL

## 2018-06-13 RX ORDER — OXYCODONE AND ACETAMINOPHEN 10; 325 MG/1; MG/1
TABLET ORAL
Qty: 120 TAB | Refills: 0 | Status: SHIPPED | OUTPATIENT
Start: 2018-06-25 | End: 2018-07-20 | Stop reason: SDUPTHER

## 2018-06-13 RX ORDER — MORPHINE SULFATE 100 MG/1
100 TABLET, FILM COATED, EXTENDED RELEASE ORAL 2 TIMES DAILY
Qty: 60 TAB | Refills: 0 | Status: SHIPPED | OUTPATIENT
Start: 2018-06-16 | End: 2018-07-20 | Stop reason: SDUPTHER

## 2018-06-13 NOTE — PROGRESS NOTES
HISTORY OF PRESENT ILLNESS  Lavelle Hagen is a 56 y.o. male    MrBj Tate presents for follow up of chronic pain due to lumbar stenosis and fibromyalgia and osteoarthritis. He had a lateral release surgery of left knee many years ago. Patient has had physical therapy with good results. He had a history of Cortizone injections for bilateral knees with good results. Dr. Kaur performed an IL-DELMIS at L2/L3 with good results.      The patient denies any significant changes in his chronic pain since last f/u.   He continues to complain of persistent pain in his knees. Severe central canal stenosis that causes weakness in his legs. This has led to falls, and inability to exercise, which in turn has led to worsening of existing arthritis of the knees and ankles.  He reports no falls since last visit. Garryyohan Paige reports he sees his primary care provider on a regular basis. Patient understands current practice transition and taper plan in future. Patient is planning on transferring his continued pain management care to another practice. He will sign a medical release form today. I will provide 1 month transition prescription. We will assist patient with any medical records transfer as needed. Current MME dose as of today:260    Current medication management consists of:morphine  mg tablet, take 1 tablet 3 times daily, Oxycodone/acetaminophen 10/325 mg tablet, take 1 tablet up to 4 times daily as needed, cyclobenzaprine 10 mg tablet, take 1 tablet 3 times daily as needed for muscle spasm  Medications are helping with pain control and quality of life. Aniket Larissa is 2-3/10 with medication and 9/10 without.  Pt describes pain as aching, stabbing, and burning. Aggravating factors include standing, sitting, and walking. Relieved with rest, medication, and avoiding painful activities. The patient reports 70% relief with current medications.   Current treatment is helping to improve general activity, mood, walking, sleep, enjoyment of life       Pain Meds and Quality Of Life have been reviewed. Nonpharmacologic therapy and non-opioid pharmacologic therapy were considered. If opioid therapy is prescribed, this is only if the expected benefits are anticipated to outweigh risks. He  is otherwise doing well with no other complaints today. He denies any adverse events including nausea, vomiting, dizziness, increased constipation, hallucinations, or seizures. The patient reports functional improvement and QOL with pain medication. Vitals:    06/13/18 0922   BP: 147/88   Pulse: (!) 103   Resp: 16   Temp: 97.5 °F (36.4 °C)   TempSrc: Oral   Weight: 90.7 kg (200 lb)   Height: 5' 9\" (1.753 m)   PainSc:   5   PainLoc: Knee         Allergies   Allergen Reactions    Naprosyn [Naproxen] Other (comments)     Upset stomach       Past Surgical History:   Procedure Laterality Date    HX ORTHOPAEDIC      lateral release (L) knee       ROS   Constitutional: Positive for malaise/fatigue. Negative for chills, diaphoresis and fever. Eyes: Negative for blurred vision and double vision. Respiratory: Negative for cough, shortness of breath and wheezing.    Cardiovascular: Positive for leg swelling (knees). Negative for chest pain and palpitations. Gastrointestinal: Negative for constipation, diarrhea and nausea. Genitourinary: Negative.    Musculoskeletal: Positive for joint pain (knees) and myalgias. Negative for falls and neck pain. Physical Exam   Constitutional: He is oriented to person, place, and time. He appears well-developed and well-nourished. No distress. HENT:   Head: Normocephalic. Right Ear: External ear normal.   Left Ear: External ear normal.   Eyes: Conjunctivae and EOM are normal. Pupils are equal, round, and reactive to light. Neck: Normal range of motion. Pulmonary/Chest: Effort normal. No respiratory distress.    Musculoskeletal: He exhibits no edema or tenderness.        Right knee: He exhibits decreased range of motion. No tenderness (but pain and crepitus) found.        Left knee: He exhibits decreased range of motion. No tenderness (but pain and crepitus) found. Mild effusion noted anteriorly       Lumbar back: He exhibits decreased range of motion and pain. He exhibits no tenderness. Neurological: He is alert and oriented to person, place, and time. No cranial nerve deficit (grossly intact). Gait (antalgic) abnormal.   Skin: Skin is warm and dry. Psychiatric: He has a normal mood and affect. His behavior is normal. Judgment and thought content normal    ASSESSMENT:    1. Chronic pain syndrome    2. Encounter for long-term (current) use of high-risk medication    3. Arthralgia of lower leg, unspecified laterality    4. Primary osteoarthritis, unspecified site    5. Lumbosacral radiculopathy    6. Degeneration of lumbar or lumbosacral intervertebral disc    7. Arthritis of left knee    8. Chronic bilateral low back pain without sciatica          COMM:   [address] [place] [address] Program was reviewed which does not demonstrate aberrancies and/or inconsistencies with regard to the historical prescribing of controlled medications to this patient by other providers. Medications were brought to visit today. Pill count was appropriate. When possible, non-drug therapy for chronic pain should be used as a first-line treatment. Physical therapy exercise regimens, chiropractic manipulation, meditation relaxation techniques, cognitive behavior therapy, acupuncture, yoga, Luis Miguel Chi,  transcutaneous electrical nerve stimulation (TENS), and application of moist heat can help alleviate pain . Explained that realistic expectations and goals with chronic pain management are to maximize function and minimize pain with the understanding that limitations will exist both in the extent of relief that she may achieve, as well as thresholds of mg strengths that we will not exceed.  Our role is to help the patient better cope with chronic pain utilizng a multimodal approach. The patients condition and plan were discussed. All questions were answered. The patient agrees with the plan. PLAN / Pt Instructions:  1. Continue current plan with no evidence of addiction or diversion. 2. Stable on current medication without adverse events. 3. Refill and decrease to morphine  mg tablet, take 1 tablet 2 times daily  4. Refill oxycodone/acetaminophen 10/325 mg tablet, take 1 tablet up to 4 times daily as needed  5. Continue cyclobenzaprine 10 mg tablet, take 1 tablet 3 times daily as needed for muscle spasm  6. Discussed risks of addiction, dependency, and opioid induced hyperalgesia. 7. Reviewed with patient benefits of home exercise, and lifestyle changes to assist in self-management of symptoms. 8. Patient plans on moving his pain management care to another provider. 9. He will tentatively schedule for 3 month follow-up. We will assist with medical records transfer as needed. Prescription monitoring program reviewed. The patient  should keep track of total Tylenol intake and make sure liver function tests have been checked with primary care physician. POC UDS today. Pain medications prescribed with the objective of pain relief and improved physical and psychosocial function in this patient. DISPOSITION  · Counseled patient on proper use of prescribed medications. · Counseled patient about chronic medical conditions and their relationship to anxiety and depression and recommended mental health support as needed. · Reviewed with patient self-help tools, home exercise, and lifestyle changes to assist the patient in self-management of symptoms. · Reviewed with patient the treatment plan, goals of treatment plan, and limitations of treatment plan, to include the potential for side effects from medications and procedures.   If side effects occur, it is the responsibility of the patient to inform the clinic so that a change in the treatment plan can be made in a safe manner. The patient is advised that stopping prescribed medication may cause an increase in symptoms and possible medication withdrawal symptoms. The patient is informed an emergency room evaluation may be necessary if this occurs. Spent 25 minutes with patient today reviewing the treatment plan, goals of treatment plan, and limitations of the treatment plan, to include the potential for side effects from medications and procedures. More than 50% of the visit time was spent counseling the patient. Slick Cruz PA-C 6/13/2018        Note: Please excuse any typographical errors. Voice recognition software was used for this note and may cause mistakes.

## 2018-06-13 NOTE — PROGRESS NOTES
Nursing Notes    Patient presents to the office today in follow-up. Patient rates his pain at 5/10 on the numerical pain scale. Reviewed medications with counts as follows:    Rx Date filled Qty Dispensed Pill Count Last Dose Short   Morphine sul Er 100mg 05/27/18 60 7 today no   Oxycodone 10-325mg 5/26/18 120 0 2 weeks yes                              Pt did not bring Rx for oxycodone; counseling done and  shows:05/26/18    Comments:     POC UDS was performed in office today    Any new labs or imaging since last appointment? NO    Have you been to an emergency room (ER) or urgent care clinic since your last visit? NO            Have you been hospitalized since your last visit? NO     If yes, where, when, and reason for visit? Have you seen or consulted any other health care providers outside of the [address] [place] [address]  since your last visit? NO     If yes, where, when, and reason for visit? Mr. Farideh Stewart has a reminder for a \"due or due soon\" health maintenance. I have asked that he contact his primary care provider for follow-up on this health maintenance.

## 2018-06-20 ENCOUNTER — TELEPHONE (OUTPATIENT)
Dept: PAIN MANAGEMENT | Age: 48
End: 2018-06-20

## 2018-06-20 NOTE — TELEPHONE ENCOUNTER
Pt called back re mser 100mg. Pharmacy filled a partial script on 6/16/18 for #10/5. Explained insurance had approved #60/30 on 5/29/18 - will not cover #90/30. Pt said pharmacist said insurance wouldn't cover full amount - refill too soon so he only received 10 pills and will be out today. Pt said pharmacy told him he will need a new script which can be filled today - mser 100mg #60/30. Told pt I would forward the info to Juliocesar Arias -will be here tomorrow. Will call when script ready. Pt not scheduled yet for f/u appt - last appt 6/13/18 - was told couldn't be scheduled at that time - would be called to schedule appt. Also, told pt I had tried calling this am, but his mailbox was full so I could not leave a vm. We cannot contact him if his mailbox is full. Pt understood - said he would clear his mailbox.

## 2018-06-20 NOTE — TELEPHONE ENCOUNTER
Tried returning pt's call re only being able to get 10 of the 60 mser from the pharmacy. Mser had been approve by [employer] for #60/30 on 5/29/18 - script filled on 6/16/18 so he should have been able to fill the full script. Could not leave vm - mailbox full.

## 2018-06-21 ENCOUNTER — TELEPHONE (OUTPATIENT)
Dept: PAIN MANAGEMENT | Age: 48
End: 2018-06-21

## 2018-06-21 DIAGNOSIS — G89.4 CHRONIC PAIN SYNDROME: Primary | ICD-10-CM

## 2018-06-21 RX ORDER — MORPHINE SULFATE 100 MG/1
100 TABLET, FILM COATED, EXTENDED RELEASE ORAL 2 TIMES DAILY
Qty: 60 TAB | Refills: 0 | Status: SHIPPED | OUTPATIENT
Start: 2018-06-21 | End: 2018-07-20 | Stop reason: SDUPTHER

## 2018-07-18 ENCOUNTER — TELEPHONE (OUTPATIENT)
Dept: PAIN MANAGEMENT | Age: 48
End: 2018-07-18

## 2018-07-18 DIAGNOSIS — G89.4 CHRONIC PAIN SYNDROME: Primary | ICD-10-CM

## 2018-07-18 DIAGNOSIS — G89.4 CHRONIC PAIN SYNDROME: ICD-10-CM

## 2018-07-18 DIAGNOSIS — M54.17 LUMBOSACRAL RADICULOPATHY: ICD-10-CM

## 2018-07-18 RX ORDER — MORPHINE SULFATE 30 MG/1
TABLET, FILM COATED, EXTENDED RELEASE ORAL
Qty: 6 TAB | Refills: 0 | Status: SHIPPED | OUTPATIENT
Start: 2018-07-18 | End: 2018-07-20 | Stop reason: SDUPTHER

## 2018-07-18 RX ORDER — MORPHINE SULFATE 80 MG/1
80 CAPSULE, EXTENDED RELEASE ORAL EVERY 12 HOURS
Qty: 60 CAP | Refills: 0 | OUTPATIENT
Start: 2018-07-18

## 2018-07-18 RX ORDER — MORPHINE SULFATE 60 MG/1
TABLET, FILM COATED, EXTENDED RELEASE ORAL
Qty: 6 TAB | Refills: 0 | Status: SHIPPED | OUTPATIENT
Start: 2018-07-18

## 2018-07-18 NOTE — PROGRESS NOTES
Discussed with patient in the office today current situation. His previous provider has left our practice and he is running out of medication. He was provided temporary 3 day prescription at a tapering dose to last until he can be fit into my schedule in 2 days.

## 2018-07-20 ENCOUNTER — OFFICE VISIT (OUTPATIENT)
Dept: PAIN MANAGEMENT | Age: 48
End: 2018-07-20

## 2018-07-20 VITALS
HEIGHT: 69 IN | SYSTOLIC BLOOD PRESSURE: 170 MMHG | TEMPERATURE: 97.3 F | BODY MASS INDEX: 29.62 KG/M2 | RESPIRATION RATE: 16 BRPM | WEIGHT: 200 LBS | DIASTOLIC BLOOD PRESSURE: 96 MMHG | HEART RATE: 70 BPM

## 2018-07-20 DIAGNOSIS — M51.37 DEGENERATION OF LUMBAR OR LUMBOSACRAL INTERVERTEBRAL DISC: ICD-10-CM

## 2018-07-20 DIAGNOSIS — M54.50 CHRONIC BILATERAL LOW BACK PAIN WITHOUT SCIATICA: ICD-10-CM

## 2018-07-20 DIAGNOSIS — G89.29 CHRONIC BILATERAL LOW BACK PAIN WITHOUT SCIATICA: ICD-10-CM

## 2018-07-20 DIAGNOSIS — M54.17 LUMBOSACRAL RADICULOPATHY: ICD-10-CM

## 2018-07-20 DIAGNOSIS — G89.4 CHRONIC PAIN SYNDROME: ICD-10-CM

## 2018-07-20 DIAGNOSIS — M19.91 PRIMARY OSTEOARTHRITIS, UNSPECIFIED SITE: ICD-10-CM

## 2018-07-20 RX ORDER — OXYCODONE AND ACETAMINOPHEN 10; 325 MG/1; MG/1
TABLET ORAL
Qty: 120 TAB | Refills: 0 | Status: SHIPPED | OUTPATIENT
Start: 2018-07-24 | End: 2018-08-20 | Stop reason: SDUPTHER

## 2018-07-20 RX ORDER — MORPHINE SULFATE 60 MG/1
60 TABLET, FILM COATED, EXTENDED RELEASE ORAL EVERY 12 HOURS
Qty: 60 TAB | Refills: 0 | Status: SHIPPED | OUTPATIENT
Start: 2018-08-19 | End: 2018-08-20

## 2018-07-20 RX ORDER — OXYCODONE AND ACETAMINOPHEN 10; 325 MG/1; MG/1
TABLET ORAL
Qty: 120 TAB | Refills: 0 | Status: SHIPPED | OUTPATIENT
Start: 2018-09-22 | End: 2018-10-19

## 2018-07-20 RX ORDER — MORPHINE SULFATE 30 MG/1
30 TABLET, FILM COATED, EXTENDED RELEASE ORAL EVERY 8 HOURS
Qty: 90 TAB | Refills: 0 | Status: SHIPPED | OUTPATIENT
Start: 2018-07-20 | End: 2018-07-20 | Stop reason: CLARIF

## 2018-07-20 RX ORDER — OXYCODONE AND ACETAMINOPHEN 10; 325 MG/1; MG/1
TABLET ORAL
Qty: 120 TAB | Refills: 0 | Status: SHIPPED | OUTPATIENT
Start: 2018-08-23 | End: 2018-08-20

## 2018-07-20 RX ORDER — MORPHINE SULFATE 60 MG/1
TABLET, FILM COATED, EXTENDED RELEASE ORAL
Qty: 90 TAB | Refills: 0 | Status: SHIPPED | OUTPATIENT
Start: 2018-07-20 | End: 2018-08-20 | Stop reason: SDUPTHER

## 2018-07-20 RX ORDER — MORPHINE SULFATE 30 MG/1
30 TABLET, FILM COATED, EXTENDED RELEASE ORAL EVERY 8 HOURS
Qty: 90 TAB | Refills: 0 | Status: SHIPPED | OUTPATIENT
Start: 2018-09-18 | End: 2018-10-18

## 2018-07-20 NOTE — PATIENT INSTRUCTIONS
1. Continue current plan with no evidence of addiction or diversion. Stable on current medication without adverse events. 2. Refill and adjust morphine ER down to 60 mg every 8 hours ×1 month, then adjust down to 60 mg every 12 hours ×1 month, then adjust down to 30 mg every 8 hours until next visit. 3. Refill oxycodone 10/325 mg up to 4 times daily as needed. 4. Please remember to bring all of your medication bottles to each and every visit even if her bottles are empty. 5. Naloxone 4 mg nasal spray for opioid induced respiratory depression emergency only. 6. Discussed risks of addiction, dependency, and opioid induced hyperalgesia. 7. Please remember to call 7 days prior to your medication refill. 8. Return to clinic in 3 months. Please call and cancel your appointment and pain management agreement if you do decide to transfer your care.

## 2018-07-20 NOTE — PROGRESS NOTES
Nursing Notes    Patient presents to the office today in follow-up. Patient rates his pain at 4/10 on the numerical pain scale. Reviewed medications with counts as follows:    Patient did not bring Percocet  mg tab.  shows last fill date 6/25/18, 120#/30. Patient states they left it at home and last dose was a couple of days ago. Rx Date filled Qty Dispensed Pill Count Last Dose Short   Morphine Sulf ER 30 mg tab 7/18/18 6 1 today no   Morphine Sulf ER 60 mg tab 7/18/18 6 1 today no                       Comments:     POC UDS was not performed in office today    Any new labs or imaging since last appointment? YES, Lab work. Have you been to an emergency room (ER) or urgent care clinic since your last visit? YES, ER. Have you been hospitalized since your last visit? NO     If yes, where, when, and reason for visit? Have you seen or consulted any other health care providers outside of the [address] [place] [address]  since your last visit? [address]     If yes, where, when, and reason for visit? Mr. Sreekanth Ogden has a reminder for a \"due or due soon\" health maintenance. I have asked that he contact his primary care provider for follow-up on this health maintenance. PHQ over the last two weeks 7/20/2018   Little interest or pleasure in doing things Not at all   Feeling down, depressed, irritable, or hopeless Not at all   Total Score PHQ 2 0     The pt's COMM was completed and given to the provider for review.  The pt scored an 14

## 2018-07-20 NOTE — PROGRESS NOTES
HISTORY OF PRESENT ILLNESS  Collette Grosser is a 56 y.o. male    HPI: Mr. Nayely Landon  returns today for f/u of chronic pain due to lumbar stenosis and fibromyalgia and osteoarthritis. H/o lateral release surgery of left knee many years ago. Prior physical therapy with good results. History of bilateral knee injections with good results. Dr. Stephen Martins prior IL-DELMIS,  L2/L3, by Dr. Juju Alexis good improvement .      Today is my first visit with Mr. Nayely Landon. He continues with pain unchanged since last visit. Discussed his current condition and medications in detail today. He was just in the office on Wednesday, July 18 as he was running out of his medications. He was previously seen in our practice by provider is no longer with our practice. We had a very lengthy conversation today about changes to our practice. Explained to him that moving forward we will be focusing on more conservative and non-opioid plan of care within our practice. We will begin tapering his morphine. Does report that his morphine was recently adjusted from 100 3 times a day down to 100 every 12 hours which was a significant drop in his medications. This was done by his previous provider. We adjusted his medication down to 90 mg during his consultation on Wednesday, July 18. We discussed tapering plan in detail today. Please see tapering plan below. We did discuss other options in the office today but he reports today that he will most likely be trying to transfer his care. I have asked him to call and cancel his appointment and pain management agreement if he does decide to transfer his care  Unfortunately did not have his oxycodone with him today. Extensive counseling was provided. Explained to him that he must bring his medications with him to each and every visit. He says that he is planning to return directly after our visit with his medication. His prior branches were reviewed and discussed.   I explained to him that we will have to begin tapering his medications more aggressively if he does not bring his medications back with him today. Current medication management consists of morphine  mg tablet, take 1 tablet 3 times daily, Oxycodone/acetaminophen 10/325 mg  up to 4 times daily as needed, cyclobenzaprine 10 mg tablet, 3 times daily as needed. Medications are helping with pain control and quality of life. Jasmin Fernandez is 2-3/10 with medication and 9/10 without.  Pt describes pain as aching, stabbing, and burning. Aggravating factors include standing, sitting, and walking. Relieved with rest, medication, and avoiding painful activities.  Current treatment is helping to improve general activity, mood, walking, sleep, enjoyment of life    Mr. Sonali Heaton is tolerating medications well, with no side effects noted. He is able to stay more active with less discomfort with these current doses. The patient reports an average of 70% pain relief with current treatment/medications. He is informed of side effects, risks, and benefits of this regimen, and emphasizes that he derives a significant improvement in functionality and quality of life, and notes that non-opioid medications and therapies in the past have not offered significant benefit. He  is otherwise doing well with no other complaints today. He denies any adverse events including nausea, vomiting, dizziness, increased constipation, hallucinations, or seizures. Because the patient's current regimen places him/her at increased risk for possible overdose, a prescription for naloxone nasal spray has been provided.   The patient understands that this medication is only to be used in the setting of a possible overdose and that inadvertent use of this medication could precipitate overt withdrawal.      MME: 240  COMM: 14  Oswestry: 30  ORT: 10  PMA updated: 7/20/2018  Last UDS has been reviewed         Allergies   Allergen Reactions    Naprosyn [Naproxen] Other (comments)     Upset stomach       Past Surgical History:   Procedure Laterality Date    HX ORTHOPAEDIC      lateral release (L) knee         Review of Systems   Constitutional: Negative for chills, fever and weight loss. HENT: Negative for congestion and sore throat. Eyes: Negative for blurred vision and double vision. Respiratory: Negative for cough, shortness of breath and wheezing. Cardiovascular: Negative for chest pain and palpitations. Gastrointestinal: Negative for abdominal pain, constipation, diarrhea, heartburn, nausea and vomiting. Genitourinary: Negative. Musculoskeletal: Positive for back pain, joint pain and neck pain. Neurological: Negative for dizziness, seizures and loss of consciousness. Endo/Heme/Allergies: Does not bruise/bleed easily. Psychiatric/Behavioral: Negative for depression. The patient has insomnia. The patient is not nervous/anxious. Physical Exam   Constitutional: He is oriented to person, place, and time and well-developed, well-nourished, and in no distress. No distress. HENT:   Head: Normocephalic and atraumatic. Pulmonary/Chest: Effort normal.   Neurological: He is alert and oriented to person, place, and time. Psychiatric: Mood, memory, affect and judgment normal.   Nursing note and vitals reviewed. ASSESSMENT:    1. Chronic pain syndrome    2. Lumbosacral radiculopathy    3. Degeneration of lumbar or lumbosacral intervertebral disc    4. Chronic bilateral low back pain without sciatica    5. Primary osteoarthritis, unspecified site           [place] Prescription Monitoring Program was reviewed which does not demonstrate aberrancies and/or inconsistencies with regard to the historical prescribing of controlled medications to this patient by other providers. PLAN / Pt Instructions:  1. Continue current plan with no evidence of addiction or diversion. Stable on current medication without adverse events.     2. Refill and adjust morphine ER down to 60 mg every 8 hours ×1 month, then adjust down to 60 mg every 12 hours ×1 month, then adjust down to 30 mg every 8 hours until next visit. 3. Refill oxycodone 10/325 mg up to 4 times daily as needed. 4. Please remember to bring all of your medication bottles to each and every visit even if her bottles are empty. 5. Naloxone 4 mg nasal spray for opioid induced respiratory depression emergency only. 6. Discussed risks of addiction, dependency, and opioid induced hyperalgesia. 7. Please remember to call 7 days prior to your medication refill. 8. Return to clinic in 3 months. Please call and cancel your appointment and pain management agreement if you do decide to transfer your care. Medications Ordered Today   Medications    morphine CR (MS CONTIN) 60 mg CR tablet     Sig: Take 1 tablet every 8 hours     Dispense:  90 Tab     Refill:  0     Please note tapering dose ×1 month    morphine CR (MS CONTIN) 60 mg CR tablet     Sig: Take 1 Tab by mouth every twelve (12) hours for 30 days. Max Daily Amount: 120 mg. Dispense:  60 Tab     Refill:  0    DISCONTD: morphine CR (MS CONTIN) 30 mg CR tablet     Sig: Take 1 Tab by mouth every eight (8) hours for 30 days. Max Daily Amount: 90 mg. Dispense:  90 Tab     Refill:  0    oxyCODONE-acetaminophen (PERCOCET)  mg per tablet     Sig: Take one tab po up to four times per day prn severe pain     Dispense:  120 Tab     Refill:  0    oxyCODONE-acetaminophen (PERCOCET)  mg per tablet     Sig: Take one tab po up to four times per day prn severe pain     Dispense:  120 Tab     Refill:  0    oxyCODONE-acetaminophen (PERCOCET)  mg per tablet     Sig: Take one tab po up to four times per day prn severe pain     Dispense:  120 Tab     Refill:  0    morphine CR (MS CONTIN) 30 mg CR tablet     Sig: Take 1 Tab by mouth every eight (8) hours for 30 days. Max Daily Amount: 90 mg.      Dispense:  90 Tab     Refill:  0         DISPOSITION     Pain medications are prescribed with the objective of pain relief and improved physical and psychosocial function in this patient.  Pain Meds and Quality Of Life have been reviewed. Nonpharmacologic therapy and non-opioid pharmacologic therapy have been considered. Opioid therapy is only prescribed if the expected benefits are anticipated to outweigh risks.  Counseled patient on proper use of prescribed medications.  Reviewed with patient self-help tools, home exercise, and lifestyle changes to assist the patient in self-management of symptoms.  Reviewed with patient the treatment plan, goals of treatment plan, and limitations of treatment plan, to include the potential for side effects from medications and procedures. If side effects occur, it is the responsibility of the patient to inform the clinic so that a change in the treatment plan can be made in a safe manner. The patient is advised that stopping prescribed medication may cause an increase in symptoms and possible medication withdrawal symptoms. The patient is informed an emergency room evaluation may be necessary if this occurs. Spent 25 minutes with patient today which more than 50% of that time was spent on counseling and coordination of care. Ora Sellers [facility] 7/20/2018        Note: Please excuse any typographical errors. Voice recognition software was used for this note and may cause mistakes.

## 2018-08-07 DIAGNOSIS — G89.29 CHRONIC BILATERAL LOW BACK PAIN WITHOUT SCIATICA: ICD-10-CM

## 2018-08-07 DIAGNOSIS — M54.50 CHRONIC BILATERAL LOW BACK PAIN WITHOUT SCIATICA: ICD-10-CM

## 2018-08-07 DIAGNOSIS — M54.17 LUMBOSACRAL RADICULOPATHY: ICD-10-CM

## 2018-08-07 DIAGNOSIS — G89.4 CHRONIC PAIN SYNDROME: ICD-10-CM

## 2018-08-07 DIAGNOSIS — M51.37 DEGENERATION OF LUMBAR OR LUMBOSACRAL INTERVERTEBRAL DISC: ICD-10-CM

## 2018-08-07 DIAGNOSIS — M19.91 PRIMARY OSTEOARTHRITIS, UNSPECIFIED SITE: ICD-10-CM

## 2018-08-20 RX ORDER — CYCLOBENZAPRINE HCL 10 MG
TABLET ORAL
Qty: 90 TAB | Refills: 0 | Status: SHIPPED | OUTPATIENT
Start: 2018-08-20 | End: 2018-09-19 | Stop reason: SDUPTHER

## 2018-08-20 RX ORDER — MORPHINE SULFATE 60 MG/1
TABLET, FILM COATED, EXTENDED RELEASE ORAL
Qty: 60 TAB | Refills: 0 | Status: SHIPPED | OUTPATIENT
Start: 2018-08-20 | End: 2018-10-19

## 2018-08-20 RX ORDER — OXYCODONE AND ACETAMINOPHEN 10; 325 MG/1; MG/1
TABLET ORAL
Qty: 120 TAB | Refills: 0 | Status: SHIPPED | OUTPATIENT
Start: 2018-08-20 | End: 2018-10-19

## 2018-08-20 NOTE — TELEPHONE ENCOUNTER
Delores Bernstein has called requesting a refill of their controlled medication, Morphine, Flexeril, and Oxycodone, for the management of Chronic pain syndrome. Last office visit date: 7/20/18    Date last  was pulled and reviewed : 8/20/18 and compliant. Last filled 7/20/18 and 7/23/18    Was the patient compliant when the above report was pulled? yes    Analgesia: Patient report 50% of pain relief with current medication regimen    Aberrancies: No aberrancies in the last 30 days. ADL's: Patient report he is able to do basic ADL's at home. Adverse Reaction:Patient report no adverse reaction. Provider's last note and plan of care reviewed? yes  Request forwarded to provider for review. Provider was made aware.

## 2018-09-11 DIAGNOSIS — M19.91 PRIMARY OSTEOARTHRITIS, UNSPECIFIED SITE: ICD-10-CM

## 2018-09-11 DIAGNOSIS — G89.29 CHRONIC BILATERAL LOW BACK PAIN WITHOUT SCIATICA: ICD-10-CM

## 2018-09-11 DIAGNOSIS — M51.37 DEGENERATION OF LUMBAR OR LUMBOSACRAL INTERVERTEBRAL DISC: ICD-10-CM

## 2018-09-11 DIAGNOSIS — G89.4 CHRONIC PAIN SYNDROME: ICD-10-CM

## 2018-09-11 DIAGNOSIS — M54.50 CHRONIC BILATERAL LOW BACK PAIN WITHOUT SCIATICA: ICD-10-CM

## 2018-09-19 DIAGNOSIS — G89.4 CHRONIC PAIN SYNDROME: ICD-10-CM

## 2018-09-19 DIAGNOSIS — M51.37 DEGENERATION OF LUMBAR OR LUMBOSACRAL INTERVERTEBRAL DISC: ICD-10-CM

## 2018-09-19 DIAGNOSIS — M54.17 LUMBOSACRAL RADICULOPATHY: ICD-10-CM

## 2018-09-20 RX ORDER — CYCLOBENZAPRINE HCL 10 MG
TABLET ORAL
Qty: 90 TAB | Refills: 0 | Status: SHIPPED | OUTPATIENT
Start: 2018-09-20

## 2018-10-19 ENCOUNTER — OFFICE VISIT (OUTPATIENT)
Dept: PAIN MANAGEMENT | Age: 48
End: 2018-10-19

## 2018-10-19 VITALS
BODY MASS INDEX: 29.62 KG/M2 | HEIGHT: 69 IN | TEMPERATURE: 97.9 F | SYSTOLIC BLOOD PRESSURE: 155 MMHG | WEIGHT: 200 LBS | HEART RATE: 76 BPM | RESPIRATION RATE: 16 BRPM | DIASTOLIC BLOOD PRESSURE: 92 MMHG

## 2018-10-19 DIAGNOSIS — M25.561 BILATERAL CHRONIC KNEE PAIN: ICD-10-CM

## 2018-10-19 DIAGNOSIS — G89.4 CHRONIC PAIN SYNDROME: Primary | ICD-10-CM

## 2018-10-19 DIAGNOSIS — M70.62 TROCHANTERIC BURSITIS OF LEFT HIP: ICD-10-CM

## 2018-10-19 DIAGNOSIS — G89.29 BILATERAL CHRONIC KNEE PAIN: ICD-10-CM

## 2018-10-19 DIAGNOSIS — Z79.899 ENCOUNTER FOR LONG-TERM (CURRENT) USE OF HIGH-RISK MEDICATION: ICD-10-CM

## 2018-10-19 DIAGNOSIS — G89.29 CHRONIC BILATERAL LOW BACK PAIN, WITH SCIATICA PRESENCE UNSPECIFIED: ICD-10-CM

## 2018-10-19 DIAGNOSIS — M25.562 BILATERAL CHRONIC KNEE PAIN: ICD-10-CM

## 2018-10-19 DIAGNOSIS — M54.5 CHRONIC BILATERAL LOW BACK PAIN, WITH SCIATICA PRESENCE UNSPECIFIED: ICD-10-CM

## 2018-10-19 DIAGNOSIS — M46.1 BILATERAL SACROILIITIS (HCC): ICD-10-CM

## 2018-10-19 RX ORDER — DICLOFENAC SODIUM 10 MG/G
2 GEL TOPICAL
Qty: 100 G | Refills: 3 | Status: SHIPPED | OUTPATIENT
Start: 2018-10-19

## 2018-10-19 RX ORDER — NALOXONE HYDROCHLORIDE 4 MG/.1ML
SPRAY NASAL
Qty: 1 EACH | Refills: 0 | Status: SHIPPED | OUTPATIENT
Start: 2018-10-19

## 2018-10-19 RX ORDER — MORPHINE SULFATE 30 MG/1
30 TABLET, FILM COATED, EXTENDED RELEASE ORAL EVERY 12 HOURS
Qty: 60 TAB | Refills: 0 | Status: SHIPPED | OUTPATIENT
Start: 2018-10-19

## 2018-10-19 RX ORDER — OXYCODONE AND ACETAMINOPHEN 10; 325 MG/1; MG/1
TABLET ORAL
Qty: 150 TAB | Refills: 0 | Status: SHIPPED | OUTPATIENT
Start: 2018-10-19

## 2018-10-19 NOTE — PATIENT INSTRUCTIONS
Learning About Sleeping Well  What does sleeping well mean? Sleeping well means getting enough sleep. How much sleep is enough varies among people. The number of hours you sleep is not as important as how you feel when you wake up. If you do not feel refreshed, you probably need more sleep. Another sign of not getting enough sleep is feeling tired during the day. The average total nightly sleep time is 7½ to 8 hours. Healthy adults may need a little more or a little less than this. Why is getting enough sleep important? Getting enough quality sleep is a basic part of good health. When your sleep suffers, your mood and your thoughts can suffer too. You may find yourself feeling more grumpy or stressed. Not getting enough sleep also can lead to serious problems, including injury, accidents, anxiety, and depression. What might cause poor sleeping? Many things can cause sleep problems, including:  · Stress. Stress can be caused by fear about a single event, such as giving a speech. Or you may have ongoing stress, such as worry about work or school. · Depression, anxiety, and other mental or emotional conditions. · Changes in your sleep habits or surroundings. This includes changes that happen where you sleep, such as noise, light, or sleeping in a different bed. It also includes changes in your sleep pattern, such as having jet lag or working a late shift. · Health problems, such as pain, breathing problems, and restless legs syndrome. · Lack of regular exercise. How can you help yourself? Here are some tips that may help you sleep more soundly and wake up feeling more refreshed. Your sleeping area  · Use your bedroom only for sleeping and sex. A bit of light reading may help you fall asleep. But if it doesn't, do your reading elsewhere in the house. Don't watch TV in bed. · Be sure your bed is big enough to stretch out comfortably, especially if you have a sleep partner.   · Keep your bedroom quiet, dark, and cool. Use curtains, blinds, or a sleep mask to block out light. To block out noise, use earplugs, soothing music, or a \"white noise\" machine. Your evening and bedtime routine  · Create a relaxing bedtime routine. You might want to take a warm shower or bath, listen to soothing music, or drink a cup of noncaffeinated tea. · Go to bed at the same time every night. And get up at the same time every morning, even if you feel tired. What to avoid  · Limit caffeine (coffee, tea, caffeinated sodas) during the day, and don't have any for at least 4 to 6 hours before bedtime. · Don't drink alcohol before bedtime. Alcohol can cause you to wake up more often during the night. · Don't smoke or use tobacco, especially in the evening. Nicotine can keep you awake. · Don't take naps during the day, especially close to bedtime. · Don't lie in bed awake for too long. If you can't fall asleep, or if you wake up in the middle of the night and can't get back to sleep within 15 minutes or so, get out of bed and go to another room until you feel sleepy. · Don't take medicine right before bed that may keep you awake or make you feel hyper or energized. Your doctor can tell you if your medicine may do this and if you can take it earlier in the day. If you can't sleep  · Imagine yourself in a peaceful, pleasant scene. Focus on the details and feelings of being in a place that is relaxing. · Get up and do a quiet or boring activity until you feel sleepy. · Don't drink any liquids after 6 p.m. if you wake up often because you have to go to the bathroom. Where can you learn more? Go to http://joaquim-fely.info/. Enter W118 in the search box to learn more about \"Learning About Sleeping Well. \"  Current as of: December 7, 2017  Content Version: 11.8  © 4322-3914 [employer], [employer].  Care instructions adapted under license by [employer] (which disclaims liability or warranty for this information). If you have questions about a medical condition or this instruction, always ask your healthcare professional. [address] any warranty or liability for your use of this information. Safe Use of Opioid Pain Medicine: Care Instructions  Your Care Instructions  Pain is your body's way of warning you that something is wrong. Pain feels different for everybody. Only you can describe your pain. A doctor can suggest or prescribe many types of medicines for pain. These range from over-the-counter medicines like acetaminophen (Tylenol) to powerful medicines called opioids. Examples of opioids are fentanyl, hydrocodone, morphine, and oxycodone. Heroin is an illegal opioid  Opioids are strong medicines. They can help you manage pain when you use them the right way. But if you misuse them, they can cause serious harm and even death. For these reasons, doctors are very careful about how they prescribe opioids. If you decide to take opioids, here are some things to remember. · Keep your doctor informed. You can get addicted to opioids. The risk is higher if you have a history of substance use. Your doctor will monitor you closely for signs of misuse and addiction and to figure out when you no longer need to take opioids. · Make a treatment plan. The goal of your plan is to be able to function and do the things you need to do, even if you still have some pain. You might be able to manage your pain with other non-opioid options like physical therapy, relaxation, or over-the-counter pain medicines. · Be aware of the side effects. Opioids can cause serious side effects, such as constipation, dry mouth, and nausea. And over time, you may need a higher dose to get pain relief. This is called tolerance. Your body also gets used to opioids. This is called physical dependence. If you suddenly stop taking them, you may have withdrawal symptoms.   The doctor carefully considered what pain medicine is right for you. You may not have received opioids if your doctor was concerned about drug interactions or your safety, or if he or she had other concerns. It is best to have one doctor or clinic treat your pain. This way you will get the pain medicine that will help you the most. And a doctor will be able to watch for any problems that the medicine might cause. The doctor has checked you carefully, but problems can develop later. If you notice any problems or new symptoms,  get medical treatment right away. Follow-up care is a key part of your treatment and safety. Be sure to make and go to all appointments, and call your doctor if you are having problems. It's also a good idea to know your test results and keep a list of the medicines you take. How can you care for yourself at home? · If you need to take opioids to manage your pain, remember these safety tips. ? Follow directions carefully. It's easy to misuse opioids if you take a dose other than what's prescribed by your doctor. This can lead to overdose and even death. Even sharing them with someone they weren't meant for is misuse. ? Be cautious. Opioids may affect your judgment and decision making. Do not drive or operate machinery until you can think clearly. Talk with your doctor about when it is safe to drive. ? Reduce the risk of drug interactions. Opioids can be dangerous if you take them with alcohol or with certain drugs like sleeping pills and muscle relaxers. Make sure your doctor knows about all the other medicines you take, including over-the-counter medicines. Don't start any new medicines before you talk to your doctor or pharmacist.  ? Keep others safe. Store opioids in a safe and secure place. Make sure that pets, children, friends, and family can't get to them. When you're done using opioids, make sure to properly dispose of them.  You can either use a community drug take-back program or your drugstore's mail-back program. If one of these programs isn't available, you can flush opioid skin patches and unused opioid pills down the toilet. ? Reduce the risk of overdose. Misuse of opioids can be very dangerous. Protect yourself by asking your doctor about a naloxone rescue kit. It can help you--and even save your life--if you take too much of an opioid. · Try other ways to reduce pain. ? Relax, and reduce stress. Relaxation techniques such as deep breathing or meditation can help. ? Keep moving. Gentle, daily exercise can help reduce pain over the long run. Try low- or no-impact exercises such as walking, swimming, and stationary biking. Do stretches to stay flexible. ? Try heat, cold packs, and massage. ? Get enough sleep. Pain can make you tired and drain your energy. Talk with your doctor if you have trouble sleeping because of pain. ? Think positive. Your thoughts can affect your pain level. Do things that you enjoy to distract yourself when you have pain instead of focusing on the pain. See a movie, read a book, listen to music, or spend time with a friend. · If you are not taking a prescription pain medicine, ask your doctor if you can take an over-the-counter medicine. When should you call for help? Call your doctor now or seek immediate medical care if:    · You have a new kind of pain.     · You have new symptoms, such as a fever or rash, along with the pain.    Watch closely for changes in your health, and be sure to contact your doctor if:    · You think you might be using too much pain medicine, and you need help to use less or stop.     · Your pain gets worse.     · You would like a referral to a doctor or clinic that specializes in pain management. Where can you learn more? Go to http://joaquim-fely.info/. Enter R108 in the search box to learn more about \"Safe Use of Opioid Pain Medicine: Care Instructions. \"  Current as of: September 10, 2017  Content Version: 11.8  © 6347-6602 [employer]. Care instructions adapted under license by [employer] (which disclaims liability or warranty for this information). If you have questions about a medical condition or this instruction, always ask your healthcare professional. [address] any warranty or liability for your use of this information.

## 2018-10-19 NOTE — PROGRESS NOTES
Referral date before 10/12/11, source PCP and for chronic low back pain bilateral knee pain. HPI:  Juan Boothe is a 53 y.o. male here for f/u visit for ongoing evaluation of chronic low back pain and bilateral knee pain. Pt was last seen here on 7/20/18. Pt denies interval changes on the character or distribution of pain. Pain is located lumbosacral belt line and ric knees. The pain is described as aching to lower back and aching, stabbing, pins and needles. Pain at its best is 3/10. Pain at its worse is 9/10. The pain is worsened by crawling, squatting, bending, standing, sitting, walking. Symptoms are improved by physical therapy, bilateral knee injections, spinal injections,heating pad. Pt has tried TENS unit with no perceived benefit.      Social History     Socioeconomic History    Marital status:      Spouse name: Not on file    Number of children: Not on file    Years of education: Not on file    Highest education level: Not on file   Social Needs    Financial resource strain: Not on file    Food insecurity - worry: Not on file    Food insecurity - inability: Not on file    Transportation needs - medical: Not on file   [place] needs - non-medical: Not on file   Occupational History    Not on file   Tobacco Use    Smoking status: Current Every Day Smoker     Packs/day: 0.50    Smokeless tobacco: Former User    Tobacco comment: pt in the process of trying to quit   Substance and Sexual Activity    Alcohol use: No    Drug use: No     Comment: previous marijuana - 90's and LSD 80's    Sexual activity: Not on file   Other Topics Concern    Not on file   Social History Narrative    Not on file     Family History   Problem Relation Age of Onset    Diabetes Unknown         grandmother    Hypertension Unknown         grandfather    Stroke Unknown         grandfather    Headache Father     Arthritis-osteo Father     Other Father         fibromyalgia     Allergies   Allergen Reactions    Naprosyn [Naproxen] Other (comments)     Upset stomach     Past Medical History:   Diagnosis Date    Hypertension      Past Surgical History:   Procedure Laterality Date    HX ORTHOPAEDIC      lateral release (L) knee     Current Outpatient Medications on File Prior to Visit   Medication Sig    cyclobenzaprine (FLEXERIL) 10 mg tablet TAKE 1 TABLET BY MOUTH THREE TIMES DAILY AS NEEDED FOR MUSCLE SPASMS    morphine CR (MS CONTIN) 60 mg CR tablet Please take 1 tablet along with 30 mg tablet to equal 90 mg every 12 hours ×3 days    butalbital-acetaminophen-caffeine (FIORICET, ESGIC) -40 mg per tablet TK 1 T PO Q 4 H PRN. NO MORE THAN 6 TS IN 24 HOURS    diclofenac (VOLTAREN) 1 % gel     dicyclomine (BENTYL) 20 mg tablet TK 1 T PO QID    ALPRAZolam (XANAX) 1 mg tablet Take 1 tab by mouth at bedtime for insomnia. May take a second tab for severe insomnia.  oxyCODONE-acetaminophen (PERCOCET)  mg per tablet Take one tab po up to four times per day prn severe pain    amLODIPine (NORVASC) 5 mg tablet Take 5 mg by mouth daily.  naloxone 4 mg/actuation spry 4 mg by Nasal route as needed for up to 2 doses. Indications: OPIOID TOXICITY    ibuprofen (MOTRIN) 200 mg tablet Take  by mouth.  [] morphine CR (MS CONTIN) 30 mg CR tablet Take 1 Tab by mouth every eight (8) hours for 30 days. Max Daily Amount: 90 mg.    citalopram (CELEXA) 20 mg tablet      No current facility-administered medications on file prior to visit. ROS:  Denies  nausea, vomiting,  constipation, abdominal pain, chest pain, shortness or breath/trouble breathing, weakness, trouble swallowing, changes in vision, changes in hearing, falls, dizziness, bladder incontinence, bowel incontinence, depression, anxiety, suicidal ideations, homicidal ideations or alcohol use.   Positive findings include fever, chills, diarrhea within the last 30 days      Opioid specific risk: Long-term concurrent opioid use.  Opioid specific history: concurrent opioid use since approximately 12 years with no opioid holiday. Vitals:    10/19/18 0824   BP: (!) 155/92   Pulse: 76   Resp: 16   Temp: 97.9 °F (36.6 °C)   Weight: 90.7 kg (200 lb)   Height: 5' 9\" (1.753 m)   PainSc:   8   PainLoc: Back        Imaging:  Last imaging 2011 new imaging ordered      Physical exam:  AFVS elevated BP, no acute distress, normal body habitus. A&OXs 3. Normocephalic, atraumatic. Conjugate gaze, clear sclerae. Speech is clear and appropriate. Mood is appropriate and patient is cooperative. Gait and balance are within functional limits. Non-labored breathing. Lungs CTA B/L. No wheezing, rales or rhonchi. Heart RRR with S1 and S2 noted. No murmurs. Bilateral knees crepitus noted with flexion and extension  Left knee erythema noted and some soft tissue edema. LE:   Strength for right lower extremity is 5/5 for hip flexion, knee extension, dorsiflexion, extensor hallucis longus, plantar flexion. Strength for left lower extremity is 5/5 for hip flexion, knee extension, dorsiflexion, extensor hallucis longus, plantar flexion. Sensation to light touch is intact for right L2-S2. Sensation to light touch is intact for left L2-S2. Muscle Stretch reflexes for right lower extremity are 2+ for L4. Muscle Stretch reflexes for left lower extremity are 2+ for L4. Negative seated straight leg raise bilaterally. Negative supine straight leg raise bilaterally. Negative Stinchfield's on the right and a left. Negative FABERs on the right and the left. Negative FADIRS on the right and the left. Full AROM lumbar flexion decreased by 50% to endrange reproduction of primary pain. Full AROM lumbar extension decreased by 50% to endrange reproduction of primary pain. TTP bilateral SIJ, left GT.     Calculated MEQ - 150  Naloxone rescue - ordered  Prophylactic bowel program - no  Date of last OCA 8/15/18  Last UDS 6/13/18, not consistent    date checked today, findings consistent    Primary Care Physician  Deangelo July [address] [place] [address] [facility] [address]    Today   Last Visit  Prior Visit  ORT -    10     PGIC -5 and 3       HIEN -36%  30%     COMM -7  14       PHQ -- . PHQ over the last two weeks 10/19/2018   Little interest or pleasure in doing things Not at all   Feeling down, depressed, irritable, or hopeless Not at all   Total Score PHQ 2 0       DSM V-OUD Screen -deferred    Assessment/Plan:     ICD-10-CM ICD-9-CM    1. Chronic pain syndrome G89.4 338.4 XR SPINE LUMB MIN 4 V      morphine CR (MS CONTIN) 30 mg CR tablet      oxyCODONE-acetaminophen (PERCOCET 10)  mg per tablet   2. Encounter for long-term (current) use of high-risk medication Z79.899 V58.69 naloxone (NARCAN) 4 mg/actuation nasal spray   3. Chronic bilateral low back pain, with sciatica presence unspecified M54.5 724.2 XR SPINE LUMB MIN 4 V    G89.29 338.29 morphine CR (MS CONTIN) 30 mg CR tablet      oxyCODONE-acetaminophen (PERCOCET 10)  mg per tablet   4. Bilateral chronic knee pain M25.561 719.46 XR KNEE RT MIN 4 V    M25.562 338.29 XR KNEE LT MIN 4 V    G89.29  morphine CR (MS CONTIN) 30 mg CR tablet      oxyCODONE-acetaminophen (PERCOCET 10)  mg per tablet   5. Bilateral sacroiliitis (HCC) M46.1 720.2    6. Trochanteric bursitis of left hip M70.62 726.5         Patient to take Aleve twice a day as needed for pain, to be taken with food    Ordered narcan to be used in case of opioid toxicity as needed    Order x-rays for lumbar spine minimum 4 views and ric knees minimal 4 views    Ordered Voltaren gel 1% to be used up to 4 times daily as needed for pain    Do not recommend long term opioid therapy for this patient at this time. Pt currently taking morphine ER 30 mg tablets 1 tablet every 8 hours for chronic pain and oxycodone/acetaminophen 10/325 mg tablet take 1 tablet 4 times daily as needed for pain. Their MME is 150. Today, we will continue the weaning of patients opioid medication with a goal of being opioid free, pending safety and compliance. Pt instructed to call if they experience any signs of withdrawal (diarrhea, nausea, vomiting, sweating or chills, agitation, itching). Today, pt given prescription for morphine ER 30 mg tablets 1 tablet every 12 hours for chronic pain and oxycodone/acetaminophen 10/325 mg tablet take 1 tablet 5 times daily as needed for pain. . Their new MME is 135. Will address short-acting opioid once extended release has been discontinued. Pt instructed to call 7-10 days before they run out of their medications for refill. At this time pt will be provided with morphine ER 15 mg 3 times daily for chronic pain and oxycodone/acetaminophen 10/325 mg tablet take 1 tablet 5 times daily as needed for pain pending safety and compliance. At following refill, pt will be provided with morphine ER 15 mg tablet 1 tablet every 12 hours daily for chronic pain and oxycodone/acetaminophen 10/325 mg tablet take 1 tablet 5 times daily as needed for pain pending safety and compliance. At next office visit, the plan is to provide patient with morphine ER 15 mg tablet 1 tablet daily for chronic pain and oxycodone/acetaminophen 10/3 2 5 mg tablet take 1 tablet 5 times daily as needed for pain. Their new MME will be 90. If patient has difficulty with the wean or difficulty with cravings we will consider referral to mental health for ongoing assessment and treatment for opioid use disorder. Consider diagnostic or therapeutic bilateral SIJ injection, diagnostic and therapeutic left GT injection, compound cream, possible Synvisc injections pending x-rays,    Follow up ongoing assessment and ongoing development of integrative and comprehensive plan of care for chronic pain. Goals:   To establish complementary and integrative plan of care to address chronic pain issues while minimizing pharmaceuticals to maximize patient's function improve quality of life. Education:  Patient again educated on the importance of strict compliance with the opioid care agreement while on opioid therapy. Patient also again educated that they should avoid driving while on chronic opioid therapy. Also advised to avoid alcohol and to avoid benzodiazepines while on opioid therapy. Handouts given regarding opioid safety and sleep health. Patient Homework:  Continue to independently investigate yoga for persons with chronic pain. Follow-up Disposition:  Return in about 3 months (around 1/8/2019). [address] was used for portions of this report. Unintended errors may occur.

## 2018-10-19 NOTE — PROGRESS NOTES
Nursing Notes    Patient presents to the office today in follow-up. Patient rates his pain at 8/10 on the numerical pain scale. Last opioid agreement 07/20/18  0Last urine drug screen 06/13/18    Comments: Patient is here today for a follow up appt today he states his pain level today is an 8  PHQ 2 was done patient has anxiety. He states he is out of medication. Patient did not bring in his meds today     POC UDS was not performed in office today    Any new labs or imaging since last appointment? NO    Have you been to an emergency room (ER) or urgent care clinic since your last visit? NO            Have you been hospitalized since your last visit? NO     If yes, where, when, and reason for visit? Have you seen or consulted any other health care providers outside of the [address] [place] [address]  since your last visit? NO     If yes, where, when, and reason for visit? Mr. Peyton Blancas has a reminder for a \"due or due soon\" health maintenance. I have asked that he contact his primary care provider for follow-up on this health maintenance.

## 2018-12-28 ENCOUNTER — DOCUMENTATION ONLY (OUTPATIENT)
Dept: PAIN MANAGEMENT | Age: 48
End: 2018-12-28

## 2019-09-03 NOTE — TELEPHONE ENCOUNTER
Tried submitting pa through cover my meds - said inactive. Called [employer] - spoke with Marcelino Hoover - explained cmm said inactive. Marcelino Hoover said he is active at this time. Did quantity pa ([id number]) over the phone - said max quantity covered for chronic pain is #60/30. Faxed to pharmacy.
Yes